# Patient Record
Sex: FEMALE | Race: WHITE | NOT HISPANIC OR LATINO | Employment: OTHER | URBAN - NONMETROPOLITAN AREA
[De-identification: names, ages, dates, MRNs, and addresses within clinical notes are randomized per-mention and may not be internally consistent; named-entity substitution may affect disease eponyms.]

---

## 2017-01-19 ENCOUNTER — OFFICE VISIT (OUTPATIENT)
Dept: CARDIOLOGY | Facility: CLINIC | Age: 72
End: 2017-01-19

## 2017-01-19 VITALS
BODY MASS INDEX: 34.96 KG/M2 | WEIGHT: 190 LBS | HEIGHT: 62 IN | HEART RATE: 101 BPM | SYSTOLIC BLOOD PRESSURE: 138 MMHG | DIASTOLIC BLOOD PRESSURE: 78 MMHG

## 2017-01-19 DIAGNOSIS — R01.1 CARDIAC MURMUR, UNSPECIFIED: ICD-10-CM

## 2017-01-19 DIAGNOSIS — R00.2 PALPITATIONS: ICD-10-CM

## 2017-01-19 DIAGNOSIS — R06.09 DYSPNEA ON EXERTION: ICD-10-CM

## 2017-01-19 DIAGNOSIS — I08.0 MITRAL AND AORTIC INSUFFICIENCY: ICD-10-CM

## 2017-01-19 DIAGNOSIS — I73.9 PVD (PERIPHERAL VASCULAR DISEASE) (HCC): Primary | ICD-10-CM

## 2017-01-19 PROCEDURE — 99213 OFFICE O/P EST LOW 20 MIN: CPT | Performed by: INTERNAL MEDICINE

## 2017-01-19 NOTE — MR AVS SNAPSHOT
Jany Landaverde   1/19/2017 2:45 PM   Office Visit    Dept Phone:  [phone]   Encounter #:  [id number]    Provider:  Hermann Coronado MD   Department:  [facility] CARDIOLOGY                Your Full Care Plan              Your Updated Medication List          This list is accurate as of: 1/19/17  3:20 PM.  Always use your most recent med list.                aspirin 81 MG EC tablet       diltiaZEM  MG 24 hr capsule   Commonly known as:  CARDIZEM CD       fluticasone 50 MCG/ACT nasal spray   Commonly known as:  FLONASE       hydrochlorothiazide 12.5 MG capsule   Commonly known as:  MICROZIDE       irbesartan 75 MG tablet   Commonly known as:  AVAPRO       levothyroxine 50 MCG tablet   Commonly known as:  SYNTHROID, LEVOTHROID       magnesium oxide 400 (241.3 MG) MG tablet tablet   Commonly known as:  MAGOX       omeprazole 20 MG capsule   Commonly known as:  priLOSEC       PROBIOTIC DAILY PO       raNITIdine 75 MG tablet   Commonly known as:  ZANTAC       vitamin B-12 2500 MCG sublingual tablet tablet   Commonly known as:  CYANOCOBALAMIN       ZOCOR 40 MG tablet   Generic drug:  simvastatin               You Were Diagnosed With        Codes Comments    PVD (peripheral vascular disease)    -  Primary ICD-10-CM: I73.9  ICD-9-CM: 443.9     Palpitations     ICD-10-CM: R00.2  ICD-9-CM: 785.1     Cardiac murmur, unspecified     ICD-10-CM: R01.1  ICD-9-CM: 785.2     Dyspnea on exertion     ICD-10-CM: R06.09  ICD-9-CM: 786.09     Mitral and aortic insufficiency     ICD-10-CM: I08.0  ICD-9-CM: 396.3       Instructions     None    Patient Instructions History      Upcoming Appointments     Visit Type Date Time Department    OFFICE VISIT 1/19/2017  2:45 PM [facility] HEART [facility] ECHO 2D COMPLETE [place] 7/12/2017 11:00 AM [facility]    OFFICE VISIT 7/27/2017  1:15 PM [facility]      Chuck Signup     [facility] allows you to send  "messages to your doctor, view your test results, renew your prescriptions, schedule appointments, and more. To sign up, go to [employer]STYLE and click on the Sign Up Now link in the New User? box. Enter your [employer] Activation Code exactly as it appears below along with the last four digits of your Social Security Number and your Date of Birth () to complete the sign-up process. If you do not sign up before the expiration date, you must request a new code.    Pegg[employer] Activation Code: [email or web address]  Expires: 2017  3:20 PM    If you have questions, you can email [email or web address] or call [phone] to talk to our [employer] staff. Remember, Peg[employer] is NOT to be used for urgent needs. For medical emergencies, dial 911.               Other Info from Your Visit           Your Appointments     2017 11:00 AM CDT   ECHOCARDIOGRAM 2D COMPLETE VISIT with [facility]ARTCARE ECHO [facility] CARDIOLOGY (--)    [address]  [place] [address] [phone]           Bring your insurance cards with you. Wear comfortable clothing and shoes.            2017  1:15 PM CDT   Office Visit with Hermann Coronado MD   [facility] CARDIOLOGY (--)    [address] [facility] [address]  [place] [address] [phone]           Arrive 15 minutes prior to appointment.              Allergies     No Known Allergies      Vital Signs     Blood Pressure Pulse Height Weight Body Mass Index Smoking Status    138/78 101 62\" (157.5 cm) 190 lb (86.2 kg) 34.75 kg/m2 Current Every Day Smoker      Problems and Diagnoses Noted     Heart murmur    Shortness of breath    Mitral and aortic insufficiency    Palpitations    PVD (peripheral vascular disease)        "

## 2017-01-22 PROCEDURE — 93000 ELECTROCARDIOGRAM COMPLETE: CPT | Performed by: INTERNAL MEDICINE

## 2017-01-22 NOTE — PROGRESS NOTES
Jany Landaverde  72 y.o. female    01/19/2017  1. PVD (peripheral vascular disease)    2. Palpitations    3. Cardiac murmur, unspecified    4. Dyspnea on exertion    5. Mitral and aortic insufficiency        History of Present Illness    Ms Landaverde is here for follow-up of her above stated problems.  She denied any chest pain but does have dyspnea on exertion which is NYHA class II and most likely related to her pulmonary status.  He has long-standing COPD.  Unfortunately she continues to smoke and we had a discussion about smoking cessation.  EKG today showed sinus rhythm heart rate of 101 bpm with poor R wave progression in the anteroseptal leads and the volttgage criteria for left and hypertrophy.  Nonspecific ST-T changes were noted.  No signs of congestive heart failure was noted.  Her valvular heart disease is clinically stable.  She is known to have mild aortic valve insufficiency and mild tricuspid regurgitation, mild mitral regurgitation and mild gradient across the left ventricular outflow tract.        SUBJECTIVE    No Known Allergies      Past Medical History   Diagnosis Date   • Cardiac murmur, unspecified    • COPD (chronic obstructive pulmonary disease)    • Dyspnea    • GERD (gastroesophageal reflux disease)    • Hyperlipidemia    • Hypertension    • Hypothyroidism    • Mitral regurgitation    • Nonrheumatic mitral valve disorder    • Palpitations    • Precordial pain    • PVD (peripheral vascular disease)    • Scarlet fever          Past Surgical History   Procedure Laterality Date   • Carotid endarterectomy     • Cardiac catheterization     • Abdominal surgery     • Carpal tunnel release     • Cataract extraction           No family history on file.      Social History     Social History   • Marital status:      Spouse name: N/A   • Number of children: N/A   • Years of education: N/A     Occupational History   • Not on file.     Social History Main Topics   • Smoking status: Current Every  "Day Smoker     Types: Cigarettes   • Smokeless tobacco: Never Used   • Alcohol use No   • Drug use: No   • Sexual activity: Defer     Other Topics Concern   • Not on file     Social History Narrative         Current Outpatient Prescriptions   Medication Sig Dispense Refill   • aspirin 81 MG EC tablet Take 81 mg by mouth Daily.     • diltiaZEM CD (CARDIZEM CD) 180 MG 24 hr capsule Take 180 mg by mouth Daily.     • fluticasone (FLONASE) 50 MCG/ACT nasal spray 2 sprays into each nostril Daily.     • hydrochlorothiazide (MICROZIDE) 12.5 MG capsule Take 12.5 mg by mouth Daily.     • irbesartan (AVAPRO) 75 MG tablet Take 75 mg by mouth Every Night.     • levothyroxine (SYNTHROID, LEVOTHROID) 50 MCG tablet Take 50 mcg by mouth Daily.     • magnesium oxide (MAGOX) 400 (241.3 MG) MG tablet tablet Take 400 mg by mouth 2 (Two) Times a Day.     • omeprazole (priLOSEC) 20 MG capsule Take 20 mg by mouth Daily.     • Probiotic Product (PROBIOTIC DAILY PO) Take  by mouth.     • raNITIdine (ZANTAC) 75 MG tablet Take 75 mg by mouth Every Night.     • simvastatin (ZOCOR) 40 MG tablet Take 40 mg by mouth Every Night.     • vitamin B-12 (CYANOCOBALAMIN) 2500 MCG sublingual tablet tablet Place  under the tongue Daily.       No current facility-administered medications for this visit.          OBJECTIVE    Visit Vitals   • /78   • Pulse 101   • Ht 62\" (157.5 cm)   • Wt 190 lb (86.2 kg)   • BMI 34.75 kg/m2           Review of Systems     Constitutional:  Denies recent weight loss, weight gain, fever or chills, no change in exercise tolerance     HENT:  Denies any hearing loss, epistaxis, hoarseness, or difficulty speaking.     Eyes: Wears eyeglasses or contact lenses     Respiratory:  Dyspnea with exertion,no cough, wheezing, or hemoptysis.     Cardiovascular: Negative for palpations, chest pain, orthopnea, PND, peripheral edema, syncope, or claudication.     Gastrointestinal:  Denies change in bowel habits, dyspepsia, ulcer disease, " hematochezia, or melena.     Endocrine: Negative for cold intolerance, heat intolerance, polydipsia, polyphagia and polyuria. Denies any history of weight change, heat/cold intolerance, polydipsia, polyuria     Genitourinary: Negative.      Musculoskeletal: Denies any history of arthritic symptoms or back problems     Skin:  Denies any change in hair or nails, rashes, or skin lesions.     Allergic/Immunologic: Negative.  Negative for environmental allergies, food allergies and immunocompromised state.     Neurological:  Denies any history of recurrent headaches, strokes, TIA, or seizure disorder.     Hematological: Denies any food allergies, seasonal allergies, bleeding disorders, or lymphadenopathy.     Psychiatric/Behavioral: Denies any history of depression, substance abuse, or change in cognitive function.         Physical Exam     Constitutional: Cooperative, alert and oriented, well-developed, well-nourished, in no acute distress.     HENT:   Head: Normocephalic, normal hair patterns, no masses or tenderness.  Ears, Nose, and Throat: No gross abnormalities. No pallor or cyanosis. Dentition good.   Eyes: EOMS intact, PERRL, conjunctivae and lids unremarkable. Fundoscopic exam and visual fields not performed.   Neck: No palpable masses or adenopathy, no thyromegaly, no JVD, carotid pulses are full and equal bilaterally and without  Bruits.     Cardiovascular: Regular rhythm, S1 and S2 normal, no S3 or S4. Apical impulse not displaced. No murmurs, gallops, or rubs detected.     Pulmonary/Chest: Chest: Increased AP diameter, no tenderness to palpation, normal respiratory excursion, no intercostal retraction, no use of accessory muscles.            Pulmonary: Normal breath sounds. No rales or ronchi.    Abdominal: Abdomen soft, bowel sounds normoactive, no masses, no hepatosplenomegaly, non-tender, no bruits.     Musculoskeletal: No deformities, clubbing, cyanosis, erythema, or edema observed. There are no spinal  abnormalities noted. Normal muscle strength and tone. Pulses full and equal in all extremities, no bruits auscultated.     Neurological: No gross motor or sensory deficits noted, affect appropriate, oriented to time, person, place.     Skin: Warm and dry to the touch, no apparent skin lesions or masses noted.     Psychiatric: She has a normal mood and affect. Her behavior is normal. Judgment and thought content normal.           ECG 12 Lead  Date/Time: 1/22/2017 5:08 PM  Performed by: CAMERON MONTENEGRO  Authorized by: CAMERON MONTENEGRO   Comparison: not compared with previous ECG   Rhythm: sinus rhythm  Rate: normal  QRS axis: normal  Other findings: LVH  Comments: EKG showed sinus rhythm heart rate of 101 bpm, normal axis, left and hypertrophy, poor R wave progression in leads V1 and V2 with nonspecific ST-T changes. EKG was performed on 1/19/17              No results found for: WBC, HGB, HCT, MCV, PLT  No results found for: GLUCOSE, BUN, CREATININE, EGFRIFNONA, EGFRIFAFRI, BCR, CO2, CALCIUM, PROTENTOTREF, ALBUMIN, LABIL2, AST, ALT  No results found for: CHOL  No results found for: TRIG  No results found for: HDL  No results found for: LDLCALC  No results found for: LDL  No results found for: HDLLDLRATIO  No components found for: CHOLHDL  No results found for: HGBA1C  No results found for: TSH, M5QGDEX, P5WAYNJ, THYROIDAB        ASSESSMENT AND PLAN    Ms. Landaverde is stable from a cardiac standpoint with no evidence of angina, arrhythmia or congestive heart failure.  Her present antiplatelet therapy with aspirin, antihypertensive therapy with diltiazem, HCTZ, Avapro and lipid-lowering therapy with simvastatin has been continued.  An echocardiogram to asses her valvular function will be arranged prior to her next visit.  Diagnoses and all orders for this visit:    PVD (peripheral vascular disease)  -     Adult Transthoracic Echo Complete; Future    Palpitations  -     Adult Transthoracic Echo  Complete; Future    Cardiac murmur, unspecified  -     Adult Transthoracic Echo Complete; Future    Dyspnea on exertion  -     Adult Transthoracic Echo Complete; Future    Mitral and aortic insufficiency  -     Adult Transthoracic Echo Complete; Future        Hermann Coronado MD  1/22/2017  5:04 PM

## 2017-08-02 ENCOUNTER — OFFICE VISIT (OUTPATIENT)
Dept: CARDIOLOGY | Facility: CLINIC | Age: 72
End: 2017-08-02

## 2017-08-02 VITALS
HEIGHT: 62 IN | BODY MASS INDEX: 35.51 KG/M2 | SYSTOLIC BLOOD PRESSURE: 146 MMHG | HEART RATE: 86 BPM | DIASTOLIC BLOOD PRESSURE: 72 MMHG | WEIGHT: 193 LBS

## 2017-08-02 DIAGNOSIS — R00.2 PALPITATIONS: ICD-10-CM

## 2017-08-02 DIAGNOSIS — I25.10 ATHEROSCLEROSIS OF NATIVE CORONARY ARTERY OF NATIVE HEART WITHOUT ANGINA PECTORIS: ICD-10-CM

## 2017-08-02 DIAGNOSIS — I08.0 MITRAL AND AORTIC INSUFFICIENCY: Primary | ICD-10-CM

## 2017-08-02 PROCEDURE — 99213 OFFICE O/P EST LOW 20 MIN: CPT | Performed by: INTERNAL MEDICINE

## 2017-08-02 RX ORDER — FUROSEMIDE 20 MG/1
20 TABLET ORAL DAILY
Qty: 30 TABLET | Refills: 3 | Status: SHIPPED | OUTPATIENT
Start: 2017-08-02

## 2017-08-02 RX ORDER — IBUPROFEN 200 MG
200 TABLET ORAL 2 TIMES DAILY
COMMUNITY

## 2017-08-02 NOTE — PROGRESS NOTES
Jany Landaverde  72 y.o. female    08/02/2017  1. Mitral and aortic insufficiency    2. Palpitations    3. Atherosclerosis of native coronary artery of native heart without angina pectoris        History of Present Illness    Ms. Landaverde is here for follow-up of above stated problems.  She denied any chest pain but has chronic NYHA class II dyspnea on exertion which is most likely secondary to her pulmonary status.  She unfortunately continues to smoke and does not wish to quit.    She has had previous cardiac catheterization which showed medically manageable noncritical disease.  Echocardiogram showed normal LV systolic function with no significant valvular abnormalities.  Her right heart pressures are within normal limits.  Her main complaint was some degree of swelling in the lower extremities.  No signs of congestive heart failure were noted.  Blood pressure was in the normal range.        SUBJECTIVE    No Known Allergies      Past Medical History:   Diagnosis Date   • Cardiac murmur, unspecified    • COPD (chronic obstructive pulmonary disease)    • Dyspnea    • GERD (gastroesophageal reflux disease)    • Hyperlipidemia    • Hypertension    • Hypothyroidism    • Mitral regurgitation    • Myocardial infarction    • Nonrheumatic mitral valve disorder    • Palpitations    • Precordial pain    • PVD (peripheral vascular disease)    • Scarlet fever    • Stroke          Past Surgical History:   Procedure Laterality Date   • ABDOMINAL SURGERY     • CARDIAC CATHETERIZATION     • CAROTID ENDARTERECTOMY     • CARPAL TUNNEL RELEASE     • CATARACT EXTRACTION           No family history on file.      Social History     Social History   • Marital status:      Spouse name: N/A   • Number of children: N/A   • Years of education: N/A     Occupational History   • Not on file.     Social History Main Topics   • Smoking status: Current Every Day Smoker     Types: Cigarettes   • Smokeless tobacco: Never Used   • Alcohol  "use No   • Drug use: No   • Sexual activity: Defer     Other Topics Concern   • Not on file     Social History Narrative         Current Outpatient Prescriptions   Medication Sig Dispense Refill   • aspirin 81 MG EC tablet Take 81 mg by mouth Daily.     • diltiaZEM CD (CARDIZEM CD) 180 MG 24 hr capsule Take 180 mg by mouth Daily.     • fluticasone (FLONASE) 50 MCG/ACT nasal spray 2 sprays into each nostril Daily.     • hydrochlorothiazide (MICROZIDE) 12.5 MG capsule Take 12.5 mg by mouth Daily.     • ibuprofen (ADVIL,MOTRIN) 200 MG tablet Take 200 mg by mouth 2 (Two) Times a Day.     • irbesartan (AVAPRO) 75 MG tablet Take 75 mg by mouth Every Night.     • levothyroxine (SYNTHROID, LEVOTHROID) 50 MCG tablet Take 50 mcg by mouth Daily.     • magnesium oxide (MAGOX) 400 (241.3 MG) MG tablet tablet Take 400 mg by mouth 2 (Two) Times a Day.     • omeprazole (priLOSEC) 20 MG capsule Take 20 mg by mouth Daily.     • Probiotic Product (PROBIOTIC DAILY PO) Take  by mouth.     • raNITIdine (ZANTAC) 75 MG tablet Take 75 mg by mouth Every Night.     • simvastatin (ZOCOR) 40 MG tablet Take 40 mg by mouth Every Night.     • vitamin B-12 (CYANOCOBALAMIN) 2500 MCG sublingual tablet tablet Place  under the tongue Daily.     • furosemide (LASIX) 20 MG tablet Take 1 tablet by mouth Daily. 30 tablet 3     No current facility-administered medications for this visit.          OBJECTIVE    /72  Pulse 86  Ht 62\" (157.5 cm)  Wt 193 lb (87.5 kg)  BMI 35.3 kg/m2        Review of Systems     Constitutional:  Denies recent weight loss, weight gain, fever or chills, no change in exercise tolerance     HENT:  Denies any hearing loss, epistaxis, hoarseness, or difficulty speaking.     Eyes: Wears eyeglasses or contact lenses     Respiratory:  Dyspnea with exertion, no cough, wheezing, or hemoptysis.     Cardiovascular: Negative for palpations, chest pain    Gastrointestinal:  Denies change in bowel habits, dyspepsia, ulcer disease, " hematochezia, or melena.     Endocrine: Negative for cold intolerance, heat intolerance, polydipsia, polyphagia and polyuria.  Genitourinary: Negative.      Musculoskeletal: Swelling in the lower extremities    Skin:  Denies any change in hair or nails, rashes, or skin lesions.     Allergic/Immunologic: Negative.  Negative for environmental allergies, food allergies and immunocompromised state.     Neurological:  Denies any history of recurrent headaches, strokes, TIA, or seizure disorder.     Hematological: Denies any food allergies, seasonal allergies, bleeding disorders, or lymphadenopathy.         Physical Exam     Constitutional: Cooperative, alert and oriented, well-developed, well-nourished, in no acute distress.     HENT:   Head: Normocephalic, normal hair patterns, no masses or tenderness.  Ears, Nose, and Throat: No gross abnormalities. No pallor or cyanosis.   Eyes: EOMS intact, PERRL, conjunctivae and lids unremarkable. Fundoscopic exam and visual fields not performed.   Neck: No palpable masses or adenopathy, no thyromegaly, no JVD, carotid pulses are full and equal bilaterally and without  Bruits.     Cardiovascular: Regular rhythm, S1 and S2 normal, no S3 or S4.  No murmurs, gallops, or rubs detected.     Pulmonary/Chest: Chest: normal symmetry, no tenderness to palpation, normal respiratory excursion, no use of accessory muscles.            Pulmonary: Normal breath sounds. No rales or ronchi.    Abdominal: Abdomen soft, bowel sounds normoactive, no masses, no hepatosplenomegaly, non-tender, no bruits.     Musculoskeletal: No deformities, clubbing, cyanosis, erythema, 1-2+ pitting edema. Pulses full and equal in all extremities, no bruits auscultated.     Neurological: No gross motor or sensory deficits noted, affect appropriate, oriented to time, person, place.     Skin: Warm and dry to the touch, no apparent skin lesions or masses noted.     Psychiatric: She has a normal mood and affect. Her  behavior is normal. Judgment and thought content normal.         Procedures      No results found for: WBC, HGB, HCT, MCV, PLT  No results found for: GLUCOSE, BUN, CREATININE, EGFRIFNONA, EGFRIFAFRI, BCR, CO2, CALCIUM, PROTENTOTREF, ALBUMIN, LABIL2, AST, ALT  No results found for: CHOL  No results found for: TRIG  No results found for: HDL  No results found for: LDLCALC  No results found for: LDL  No results found for: HDLLDLRATIO  No components found for: CHOLHDL  No results found for: HGBA1C  No results found for: TSH, V8ZGKNC, I5ILMVG, THYROIDAB        ASSESSMENT AND PLAN  Ms. Landaverde is stable from a cardiac standpoint with no definite evidence of angina or congestive heart failure.  Her dyspnea is most likely related to her pulmonary status.  I suspect that her leg edema is secondary to side effect of diltiazem CD and venous stasis.  I have advised her to take Lasix 20 mg to be taken up to 3 times a week.  Her present antiplatelet therapy with aspirin, antihypertensive therapy with Cardizem CD, HCTZ and Avapro and lipid-lowering therapy with simvastatin has been continued.    Diagnoses and all orders for this visit:    Mitral and aortic insufficiency    Palpitations    Atherosclerosis of native coronary artery of native heart without angina pectoris    Other orders  -     furosemide (LASIX) 20 MG tablet; Take 1 tablet by mouth Daily.        Hermann Coronado MD  8/2/2017  10:35 AM

## 2018-02-06 ENCOUNTER — OFFICE VISIT (OUTPATIENT)
Dept: CARDIOLOGY | Facility: CLINIC | Age: 73
End: 2018-02-06

## 2018-02-06 VITALS
HEIGHT: 62 IN | DIASTOLIC BLOOD PRESSURE: 82 MMHG | BODY MASS INDEX: 36.07 KG/M2 | HEART RATE: 85 BPM | SYSTOLIC BLOOD PRESSURE: 140 MMHG | WEIGHT: 196 LBS

## 2018-02-06 DIAGNOSIS — I35.0 NONRHEUMATIC AORTIC VALVE STENOSIS: ICD-10-CM

## 2018-02-06 DIAGNOSIS — I25.10 ATHEROSCLEROSIS OF NATIVE CORONARY ARTERY OF NATIVE HEART WITHOUT ANGINA PECTORIS: Primary | ICD-10-CM

## 2018-02-06 DIAGNOSIS — R01.1 CARDIAC MURMUR: ICD-10-CM

## 2018-02-06 PROCEDURE — 99214 OFFICE O/P EST MOD 30 MIN: CPT | Performed by: INTERNAL MEDICINE

## 2018-02-06 NOTE — PROGRESS NOTES
Jany Landaverde  73 y.o. female    02/06/2018  1. Atherosclerosis of native coronary artery of native heart without angina pectoris    2. Nonrheumatic aortic valve stenosis    3. Cardiac murmur, unspecified        History of Present Illness    Ms. Landaverde is here for follow-up of her above stated problems.  She denied any chest pain, shortness of breath, palpitation, dizziness or syncope.  Echocardiogram in July 2017 showed normal left systolic function at different hypertrophy, diastolic dysfunction and mild aortic valve stenosis.  She has undergone cardiac catheterization echo in 2013 which showed mild to moderate lesion in the LAD up to 50% and noncritical disease in the circumflex coronary artery and right coronary.  No bronchospasm or signs of congestive heart failure was noted.        SUBJECTIVE    No Known Allergies      Past Medical History:   Diagnosis Date   • Cardiac murmur, unspecified    • COPD (chronic obstructive pulmonary disease)    • Dyspnea    • GERD (gastroesophageal reflux disease)    • Hyperlipidemia    • Hypertension    • Hypothyroidism    • Mitral regurgitation    • Myocardial infarction    • Nonrheumatic mitral valve disorder    • Palpitations    • Precordial pain    • PVD (peripheral vascular disease)    • Scarlet fever    • Stroke          Past Surgical History:   Procedure Laterality Date   • ABDOMINAL SURGERY     • CARDIAC CATHETERIZATION     • CAROTID ENDARTERECTOMY     • CARPAL TUNNEL RELEASE     • CATARACT EXTRACTION           Family History   Problem Relation Age of Onset   • No Known Problems Mother    • No Known Problems Father          Social History     Social History   • Marital status:      Spouse name: N/A   • Number of children: N/A   • Years of education: N/A     Occupational History   • Not on file.     Social History Main Topics   • Smoking status: Current Every Day Smoker     Types: Cigarettes   • Smokeless tobacco: Never Used   • Alcohol use No   • Drug use: No  "  • Sexual activity: Defer     Other Topics Concern   • Not on file     Social History Narrative         Current Outpatient Prescriptions   Medication Sig Dispense Refill   • aspirin 81 MG EC tablet Take 81 mg by mouth Daily.     • diltiaZEM CD (CARDIZEM CD) 180 MG 24 hr capsule Take 180 mg by mouth Daily.     • fluticasone (FLONASE) 50 MCG/ACT nasal spray 2 sprays into each nostril Daily.     • furosemide (LASIX) 20 MG tablet Take 1 tablet by mouth Daily. 30 tablet 3   • hydrochlorothiazide (MICROZIDE) 12.5 MG capsule Take 12.5 mg by mouth Daily.     • ibuprofen (ADVIL,MOTRIN) 200 MG tablet Take 200 mg by mouth 2 (Two) Times a Day.     • irbesartan (AVAPRO) 75 MG tablet Take 75 mg by mouth Every Night.     • levothyroxine (SYNTHROID, LEVOTHROID) 50 MCG tablet Take 50 mcg by mouth Daily.     • magnesium oxide (MAGOX) 400 (241.3 MG) MG tablet tablet Take 400 mg by mouth 2 (Two) Times a Day.     • omeprazole (priLOSEC) 20 MG capsule Take 20 mg by mouth Daily.     • Probiotic Product (PROBIOTIC DAILY PO) Take  by mouth.     • raNITIdine (ZANTAC) 75 MG tablet Take 75 mg by mouth Every Night.     • simvastatin (ZOCOR) 40 MG tablet Take 40 mg by mouth Every Night.     • vitamin B-12 (CYANOCOBALAMIN) 2500 MCG sublingual tablet tablet Place  under the tongue Daily.       No current facility-administered medications for this visit.          OBJECTIVE    /82  Pulse 85  Ht 157.5 cm (62\")  Wt 88.9 kg (196 lb)  BMI 35.85 kg/m2        Review of Systems     Constitutional:  Denies recent weight loss, weight gain, fever or chills, no change in exercise tolerance     HENT:  Denies any hearing loss, epistaxis, hoarseness, or difficulty speaking.     Eyes: Wears eyeglasses or contact lenses     Respiratory:  Denies dyspnea with exertion,no cough, wheezing, or hemoptysis.     Cardiovascular: Negative for palpations, chest pain, orthopnea, PND, peripheral edema, syncope, or claudication.     Gastrointestinal:  Denies change in " bowel habits, dyspepsia, ulcer disease, hematochezia, or melena.     Endocrine: Negative for cold intolerance, heat intolerance, polydipsia, polyphagia and polyuria.    Genitourinary: Negative.      Musculoskeletal: Denies any history of arthritic symptoms or back problems     Skin:  Denies any change in hair or nails, rashes, or skin lesions.     Allergic/Immunologic: Negative.  Negative for environmental allergies, food allergies and immunocompromised state.     Neurological:  Denies any history of recurrent headaches, strokes, TIA, or seizure disorder.     Hematological: Denies any food allergies, seasonal allergies, bleeding disorders, or lymphadenopathy.     Psychiatric/Behavioral: Denies any history of depression, substance abuse, or change in cognitive function.         Physical Exam     Constitutional: Cooperative, alert and oriented, well-developed, well-nourished, in no acute distress.     HENT:   Head: Normocephalic, normal hair patterns, no masses or tenderness.  Ears, Nose, and Throat: No gross abnormalities. No pallor or cyanosis.   Eyes: EOMS intact, PERRL, conjunctivae and lids unremarkable. Fundoscopic exam and visual fields not performed.   Neck: No palpable masses or adenopathy, no thyromegaly, no JVD, carotid pulses are full and equal bilaterally and without  Bruits.     Cardiovascular: Regular rhythm, S1 and S2 normal, no S3 or S4, 3/6 systolic murmur, gallops, or rubs detected.     Pulmonary/Chest: Chest: normal symmetry, no tenderness to palpation, normal respiratory excursion, no intercostal retraction, no use of accessory muscles.            Pulmonary: Normal breath sounds. No rales or ronchi.    Abdominal: Abdomen soft, bowel sounds normoactive, no masses, no hepatosplenomegaly, non-tender, no bruits.     Musculoskeletal: No deformities, clubbing, cyanosis, erythema, or edema observed. There are no spinal abnormalities noted.     Neurological: No gross motor or sensory deficits noted,  affect appropriate, oriented to time, person, place.     Skin: Warm and dry to the touch, no apparent skin lesions or masses noted.     Psychiatric: She has a normal mood and affect. Her behavior is normal. Judgment and thought content normal.         Procedures      No results found for: WBC, HGB, HCT, MCV, PLT  No results found for: GLUCOSE, BUN, CREATININE, EGFRIFNONA, EGFRIFAFRI, BCR, CO2, CALCIUM, PROTENTOTREF, ALBUMIN, LABIL2, AST, ALT  No results found for: CHOL  No results found for: TRIG  No results found for: HDL  No results found for: LDLCALC  No results found for: LDL  No results found for: HDLLDLRATIO  No components found for: CHOLHDL  No results found for: HGBA1C  No results found for: TSH, L2JYOSZ, K1HSHLF, THYROIDAB        ASSESSMENT AND PLAN  Ms. Landaverde is stable with regards to her heart with no definite evidence of angina, arrhythmia or congestive heart failure.  She has mild aortic valve stenosis which is being monitored closely.  Antiplatelet therapy with aspirin, antihypertensive therapy with Cardizem CD, Avapro, HCTZ, statin therapy with Zocor have been continued.  She takes intermittent Lasix.  She will be seeing  soon and will have lab work done.    Jany was seen today for follow-up.    Diagnoses and all orders for this visit:    Atherosclerosis of native coronary artery of native heart without angina pectoris    Nonrheumatic aortic valve stenosis    Cardiac murmur, unspecified        Hermann Coronado MD  2/6/2018  11:06 AM

## 2019-04-09 ENCOUNTER — OFFICE VISIT (OUTPATIENT)
Dept: CARDIOLOGY | Facility: CLINIC | Age: 74
End: 2019-04-09

## 2019-04-09 VITALS
BODY MASS INDEX: 35.33 KG/M2 | OXYGEN SATURATION: 98 % | HEART RATE: 90 BPM | WEIGHT: 192 LBS | HEIGHT: 62 IN | SYSTOLIC BLOOD PRESSURE: 122 MMHG | DIASTOLIC BLOOD PRESSURE: 80 MMHG

## 2019-04-09 DIAGNOSIS — R06.09 DYSPNEA ON EXERTION: ICD-10-CM

## 2019-04-09 DIAGNOSIS — R01.1 CARDIAC MURMUR: ICD-10-CM

## 2019-04-09 DIAGNOSIS — I25.10 ATHEROSCLEROSIS OF NATIVE CORONARY ARTERY OF NATIVE HEART WITHOUT ANGINA PECTORIS: Primary | ICD-10-CM

## 2019-04-09 DIAGNOSIS — I35.0 NONRHEUMATIC AORTIC VALVE STENOSIS: ICD-10-CM

## 2019-04-09 PROCEDURE — 99214 OFFICE O/P EST MOD 30 MIN: CPT | Performed by: INTERNAL MEDICINE

## 2019-04-09 RX ORDER — LOSARTAN POTASSIUM 50 MG/1
50 TABLET ORAL DAILY
COMMUNITY
End: 2019-10-18

## 2019-04-09 NOTE — PROGRESS NOTES
Jany Landaverde  74 y.o. female    04/09/2019  1. Atherosclerosis of native coronary artery of native heart without angina pectoris    2. Nonrheumatic aortic valve stenosis    3. Cardiac murmur, unspecified    4. Dyspnea on exertion        History of Present Illness  Ms. Landaverde is here for follow-up of her above-stated problems.  She denied any chest pain, shortness of breath, palpitation, dizziness or syncope.  She has been compliant with her medications.  She unfortunately continues to smoke and we had a discussion about smoking cessation.  She is known to have mild aortic valve insufficiency by echocardiogram in the past.  Cardiac catheterization in 2013 showed medically manageable noncritical CAD.    SUBJECTIVE    No Known Allergies      Past Medical History:   Diagnosis Date   • Cardiac murmur, unspecified    • COPD (chronic obstructive pulmonary disease) (CMS/HCC)    • Dyspnea    • GERD (gastroesophageal reflux disease)    • Hyperlipidemia    • Hypertension    • Hypothyroidism    • Mitral regurgitation    • Myocardial infarction (CMS/HCC)    • Nonrheumatic mitral valve disorder    • Palpitations    • Precordial pain    • PVD (peripheral vascular disease) (CMS/HCC)    • Scarlet fever    • Stroke (CMS/HCC)          Past Surgical History:   Procedure Laterality Date   • ABDOMINAL SURGERY     • CARDIAC CATHETERIZATION     • CAROTID ENDARTERECTOMY     • CARPAL TUNNEL RELEASE     • CATARACT EXTRACTION           Family History   Problem Relation Age of Onset   • No Known Problems Mother    • No Known Problems Father          Social History     Socioeconomic History   • Marital status:      Spouse name: Not on file   • Number of children: Not on file   • Years of education: Not on file   • Highest education level: Not on file   Tobacco Use   • Smoking status: Current Every Day Smoker     Types: Cigarettes   • Smokeless tobacco: Never Used   Substance and Sexual Activity   • Alcohol use: No   • Drug use: No  "  • Sexual activity: Defer         Current Outpatient Medications   Medication Sig Dispense Refill   • aspirin 81 MG EC tablet Take 81 mg by mouth Daily.     • diltiaZEM CD (CARDIZEM CD) 180 MG 24 hr capsule Take 180 mg by mouth Daily.     • fluticasone (FLONASE) 50 MCG/ACT nasal spray 2 sprays into each nostril Daily.     • furosemide (LASIX) 20 MG tablet Take 1 tablet by mouth Daily. 30 tablet 3   • hydrochlorothiazide (MICROZIDE) 12.5 MG capsule Take 12.5 mg by mouth Daily.     • ibuprofen (ADVIL,MOTRIN) 200 MG tablet Take 200 mg by mouth 2 (Two) Times a Day.     • irbesartan (AVAPRO) 75 MG tablet Take 75 mg by mouth Every Night.     • levothyroxine (SYNTHROID, LEVOTHROID) 50 MCG tablet Take 50 mcg by mouth Daily.     • losartan (COZAAR) 50 MG tablet Take 50 mg by mouth Daily.     • magnesium oxide (MAGOX) 400 (241.3 MG) MG tablet tablet Take 400 mg by mouth 2 (Two) Times a Day.     • omeprazole (priLOSEC) 20 MG capsule Take 20 mg by mouth Daily.     • Probiotic Product (PROBIOTIC DAILY PO) Take  by mouth.     • raNITIdine (ZANTAC) 75 MG tablet Take 75 mg by mouth Every Night.     • simvastatin (ZOCOR) 40 MG tablet Take 40 mg by mouth Every Night.     • vitamin B-12 (CYANOCOBALAMIN) 2500 MCG sublingual tablet tablet Place  under the tongue Daily.       No current facility-administered medications for this visit.          OBJECTIVE    /80   Pulse 90   Ht 157.5 cm (62\")   Wt 87.1 kg (192 lb)   SpO2 98%   BMI 35.12 kg/m²         Review of Systems     Constitutional:  Denies recent weight loss, weight gain, fever or chills, no change in exercise tolerance     HENT:  Denies any hearing loss, epistaxis, hoarseness, or difficulty speaking.     Eyes: Wears eyeglasses or contact lenses     Respiratory:  Denies dyspnea with exertion,no cough, wheezing, or hemoptysis.     Cardiovascular: Negative for palpations, chest pain, orthopnea, PND, peripheral edema, syncope, or claudication.     Gastrointestinal:  Denies " change in bowel habits, dyspepsia, ulcer disease, hematochezia, or melena.     Endocrine: Negative for cold intolerance, heat intolerance, polydipsia, polyphagia and polyuria. Hypothyroidism, hyperlipidemia    Genitourinary: Negative.      Musculoskeletal: DJD    Skin:  Denies any change in hair or nails, rashes, or skin lesions.     Allergic/Immunologic: Negative.  Negative for environmental allergies, food allergies and immunocompromised state.     Neurological:  Denies any history of recurrent headaches, strokes, TIA, or seizure disorder.     Hematological: Denies any food allergies, seasonal allergies, bleeding disorders, or lymphadenopathy.     Psychiatric/Behavioral: Denies any history of depression, substance abuse, or change in cognitive function.         Physical Exam     Constitutional: Cooperative, alert and oriented, in no acute distress.     HENT:   Head: Normocephalic, normal hair patterns, no masses or tenderness.  Ears, Nose, and Throat: No gross abnormalities. No pallor or cyanosis.   Eyes: EOMS intact, PERRL, conjunctivae and lids unremarkable. Fundoscopic exam and visual fields not performed.   Neck: No palpable masses or adenopathy, no thyromegaly, no JVD, carotid pulses are full and equal bilaterally and without  Bruits.     Cardiovascular: Regular rhythm, S1 and S2 normal, no S3 or S4. 2/6 systolic murmur, no gallops, or rubs detected.     Pulmonary/Chest: Chest: normal symmetry,  normal respiratory excursion, no intercostal retraction, no use of accessory muscles.            Pulmonary: Normal breath sounds. No rales or ronchi.    Abdominal: Abdomen soft, bowel sounds normoactive, no masses, no hepatosplenomegaly, non-tender, no bruits.     Musculoskeletal: No deformities, clubbing, cyanosis, erythema, or edema observed.    Neurological: No gross motor or sensory deficits noted, affect appropriate, oriented to time, person, place.     Skin: Warm and dry to the touch, no apparent skin lesions or  masses noted.     Psychiatric: She has a normal mood and affect. Her behavior is normal. Judgment and thought content normal.         Procedures      No results found for: WBC, HGB, HCT, MCV, PLT  No results found for: GLUCOSE, BUN, CREATININE, EGFRIFNONA, EGFRIFAFRI, BCR, CO2, CALCIUM, PROTENTOTREF, ALBUMIN, LABIL2, AST, ALT  Lab Results   Component Value Date    CHOL 171 03/15/2019     Lab Results   Component Value Date    TRIG 201 (H) 03/15/2019     Lab Results   Component Value Date    HDL 42 03/15/2019     No components found for: LDLCALC  Lab Results   Component Value Date    LDL 95 03/15/2019     No results found for: HDLLDLRATIO  No components found for: CHOLHDL  Lab Results   Component Value Date    HGBA1C 6.5 (H) 03/15/2019     Lab Results   Component Value Date    TSH 3.08 03/15/2019           ASSESSMENT AND PLAN  Jany Landaverde is clinically stable with no evidence of angina, arrhythmia or congestive heart failure.  Her blood pressures in the normal range.  To assess her aortic valve and echocardiogram is being arranged.  Antiplatelet therapy with aspirin, antihypertensive therapy with Cardizem CD, HCTZ, Avapro, lipid-lowering therapy with simvastatin has been continued.  Her lipid profiles were in the normal range when checked in March 2019.    Jany was seen today for follow-up.    Diagnoses and all orders for this visit:    Atherosclerosis of native coronary artery of native heart without angina pectoris  -     Adult Transthoracic Echo Complete W/ Cont if Necessary Per Protocol; Future    Nonrheumatic aortic valve stenosis  -     Adult Transthoracic Echo Complete W/ Cont if Necessary Per Protocol; Future    Cardiac murmur, unspecified  -     Adult Transthoracic Echo Complete W/ Cont if Necessary Per Protocol; Future    Dyspnea on exertion  -     Adult Transthoracic Echo Complete W/ Cont if Necessary Per Protocol; Future        Patient's Body mass index is 35.12 kg/m². BMI is above normal parameters.  Recommendations include: exercise counseling and nutrition counseling.      I advised Jany of the risks of continuing to use tobacco, and I provided her with tobacco cessation educational materials in the After Visit Summary.     During this visit, I spent 3 minutes counseling the patient regarding tobacco cessation.      Hermann Coronado MD  4/9/2019  5:40 PM

## 2019-04-24 ENCOUNTER — DOCUMENTATION (OUTPATIENT)
Dept: CARDIOLOGY | Facility: CLINIC | Age: 74
End: 2019-04-24

## 2019-10-18 ENCOUNTER — OFFICE VISIT (OUTPATIENT)
Dept: CARDIOLOGY | Facility: CLINIC | Age: 74
End: 2019-10-18

## 2019-10-18 VITALS
SYSTOLIC BLOOD PRESSURE: 128 MMHG | HEART RATE: 87 BPM | WEIGHT: 198.7 LBS | BODY MASS INDEX: 36.57 KG/M2 | HEIGHT: 62 IN | OXYGEN SATURATION: 98 % | DIASTOLIC BLOOD PRESSURE: 80 MMHG

## 2019-10-18 DIAGNOSIS — I25.10 ATHEROSCLEROSIS OF NATIVE CORONARY ARTERY OF NATIVE HEART WITHOUT ANGINA PECTORIS: Primary | ICD-10-CM

## 2019-10-18 DIAGNOSIS — I08.0 MITRAL AND AORTIC INSUFFICIENCY: ICD-10-CM

## 2019-10-18 DIAGNOSIS — I73.9 PVD (PERIPHERAL VASCULAR DISEASE) (HCC): ICD-10-CM

## 2019-10-18 DIAGNOSIS — R01.1 CARDIAC MURMUR: ICD-10-CM

## 2019-10-18 PROCEDURE — 99214 OFFICE O/P EST MOD 30 MIN: CPT | Performed by: INTERNAL MEDICINE

## 2019-10-18 NOTE — PROGRESS NOTES
Jany Landaverde  74 y.o. female    10/18/2019  1. Atherosclerosis of native coronary artery of native heart without angina pectoris    2. PVD (peripheral vascular disease) (CMS/HCC)    3. Cardiac murmur, unspecified    4. Mitral and aortic insufficiency        History of Present Illness  Jany Landaverde is here for follow-up of her above-stated problems.  She has done well from a cardiac standpoint with no chest pain, shortness of breath, palpitation, dizziness or syncope.  She has been compliant with her medication but unfortunately continues to smoke and does not wish to quit.  We had a discussion about smoking cessation.  Echocardiogram in April 2019 showed:  · Left ventricular wall thickness is consistent with mild concentric hypertrophy.  · Estimated EF = 61%.  · Left ventricular systolic function is normal.  · Left ventricular diastolic dysfunction (grade I) consistent with impaired relaxation.  · Left atrial cavity size is moderately dilated.  · The valve was poorly visualized but appears trileaflet. The aortic valve is abnormal in structure. No significant aortic valve regurgitation is present. No hemodynamically significant aortic valve stenosis is present.    SUBJECTIVE    No Known Allergies      Past Medical History:   Diagnosis Date   • Cardiac murmur, unspecified    • COPD (chronic obstructive pulmonary disease) (CMS/HCC)    • Dyspnea    • GERD (gastroesophageal reflux disease)    • Hyperlipidemia    • Hypertension    • Hypothyroidism    • Mitral regurgitation    • Myocardial infarction (CMS/HCC)    • Nonrheumatic mitral valve disorder    • Palpitations    • Precordial pain    • PVD (peripheral vascular disease) (CMS/HCC)    • Scarlet fever    • Stroke (CMS/HCC)          Past Surgical History:   Procedure Laterality Date   • ABDOMINAL SURGERY     • CARDIAC CATHETERIZATION     • CAROTID ENDARTERECTOMY     • CARPAL TUNNEL RELEASE     • CATARACT EXTRACTION           Family History   Problem Relation Age  "of Onset   • No Known Problems Mother    • No Known Problems Father          Social History     Socioeconomic History   • Marital status:      Spouse name: Not on file   • Number of children: Not on file   • Years of education: Not on file   • Highest education level: Not on file   Tobacco Use   • Smoking status: Current Every Day Smoker     Types: Cigarettes   • Smokeless tobacco: Never Used   Substance and Sexual Activity   • Alcohol use: No   • Drug use: No   • Sexual activity: Defer         Current Outpatient Medications   Medication Sig Dispense Refill   • aspirin 81 MG EC tablet Take 81 mg by mouth Daily.     • diltiaZEM CD (CARDIZEM CD) 180 MG 24 hr capsule Take 180 mg by mouth Daily.     • fluticasone (FLONASE) 50 MCG/ACT nasal spray 2 sprays into each nostril Daily.     • furosemide (LASIX) 20 MG tablet Take 1 tablet by mouth Daily. 30 tablet 3   • hydrochlorothiazide (MICROZIDE) 12.5 MG capsule Take 12.5 mg by mouth Daily.     • ibuprofen (ADVIL,MOTRIN) 200 MG tablet Take 200 mg by mouth 2 (Two) Times a Day.     • irbesartan (AVAPRO) 75 MG tablet Take 75 mg by mouth Every Night.     • levothyroxine (SYNTHROID, LEVOTHROID) 50 MCG tablet Take 50 mcg by mouth Daily.     • losartan (COZAAR) 50 MG tablet Take 50 mg by mouth Daily.     • magnesium oxide (MAGOX) 400 (241.3 MG) MG tablet tablet Take 400 mg by mouth 2 (Two) Times a Day.     • omeprazole (priLOSEC) 20 MG capsule Take 20 mg by mouth Daily.     • Probiotic Product (PROBIOTIC DAILY PO) Take  by mouth.     • simvastatin (ZOCOR) 40 MG tablet Take 40 mg by mouth Every Night.     • vitamin B-12 (CYANOCOBALAMIN) 2500 MCG sublingual tablet tablet Place  under the tongue Daily.     • raNITIdine (ZANTAC) 75 MG tablet Take 75 mg by mouth Every Night.       No current facility-administered medications for this visit.          OBJECTIVE    /80   Pulse 87   Ht 157.5 cm (62.01\")   Wt 90.1 kg (198 lb 11.2 oz)   SpO2 98%   BMI 36.33 kg/m² "         Review of Systems     Constitutional:  Denies recent weight loss, weight gain, fever or chills     HENT:  Denies any hearing loss, epistaxis, hoarseness, or difficulty speaking.     Eyes: Wears eyeglasses or contact lenses     Respiratory:  Denies dyspnea with exertion,no cough, wheezing, or hemoptysis.     Cardiovascular: Negative for palpations, chest pain, orthopnea, PND    Gastrointestinal:  Denies change in bowel habits, dyspepsia, ulcer disease, hematochezia, or melena.     Endocrine: Negative for cold intolerance, heat intolerance, polydipsia, polyphagia and polyuria.     Genitourinary: Negative.      Musculoskeletal: Denies any history of arthritic symptoms or back problems     Skin:  Denies any change in hair or nails, rashes, or skin lesions.     Allergic/Immunologic: Negative.  Negative for environmental allergies, food allergies and immunocompromised state.     Neurological:  Denies any history of recurrent headaches, strokes, TIA, or seizure disorder.     Hematological: Denies any food allergies, seasonal allergies, bleeding disorders, or lymphadenopathy.     Psychiatric/Behavioral: Denies any history of depression, substance abuse, or change in cognitive function.         Physical Exam     Constitutional: Cooperative, alert and oriented,  in no acute distress. obese    HENT:   Head: Normocephalic, normal hair patterns, no masses or tenderness.  Ears, Nose, and Throat: No gross abnormalities. No pallor or cyanosis.   Eyes: EOMS intact, PERRL, conjunctivae and lids unremarkable. Fundoscopic exam and visual fields not performed.   Neck: No palpable masses or adenopathy, no thyromegaly, no JVD, carotid pulses are full and equal bilaterally and without  Bruits. History of carotid endarterectomy on the right    Cardiovascular: Regular rhythm, S1 and S2 normal, no S3 or S4. 2/6 systolic murmurs, no gallops, or rubs detected.     Pulmonary/Chest: Chest: normal symmetry, no tenderness to palpation,  normal respiratory excursion, no intercostal retraction, no use of accessory muscles.            Pulmonary: Normal breath sounds. No rales or ronchi.    Abdominal: Abdomen soft, bowel sounds normoactive, no masses, no hepatosplenomegaly, non-tender, no bruits.     Musculoskeletal: No deformities, clubbing, cyanosis, erythema, or edema observed.    Neurological: No gross motor or sensory deficits noted, affect appropriate, oriented to time, person, place.     Skin: Warm and dry to the touch, no apparent skin lesions or masses noted.     Psychiatric: She has a normal mood and affect. Her behavior is normal. Judgment and thought content normal.         Procedures      No results found for: WBC, HGB, HCT, MCV, PLT  Lab Results   Component Value Date    BUN 16 03/15/2019    CREATININE 0.9 03/15/2019    CALCIUM 10.4 03/15/2019    ALBUMIN 3.4 03/15/2019    AST 16 03/15/2019    ALT 24 (L) 03/15/2019     Lab Results   Component Value Date    CHOL 171 03/15/2019     Lab Results   Component Value Date    TRIG 201 (H) 03/15/2019     Lab Results   Component Value Date    HDL 42 03/15/2019     No components found for: LDLCALC  Lab Results   Component Value Date    LDL 95 03/15/2019     No results found for: HDLLDLRATIO  No components found for: CHOLHDL  Lab Results   Component Value Date    HGBA1C 6.5 (H) 03/15/2019     Lab Results   Component Value Date    TSH 3.08 03/15/2019           ASSESSMENT AND PLAN  Jany Akhil stable with regard to her heart.  No clinical evidence of angina, arrhythmia or congestive heart failure was noted.  Her lipid profiles were in the normal range when checked in March this year.  Smoking cessation was stressed.  She has been advised to continue antiplatelet therapy with aspirin, antihypertensive therapy with Cardizem CD, HCTZ, Avapro, lipid-lowering therapy with simvastatin has been continued.    Jany was seen today for follow-up.    Diagnoses and all orders for this visit:    Atherosclerosis of  native coronary artery of native heart without angina pectoris    PVD (peripheral vascular disease) (CMS/[facility])    Cardiac murmur, unspecified    Mitral and aortic insufficiency        Patient's Body mass index is 36.33 kg/m². BMI is above normal parameters. Recommendations include: exercise counseling and nutrition counseling.      Jany Landaverde is a current cigarettes user.  She currently smokes 1 pack of cigarettes per day for a duration of 50 years. I have educated her on the risk of diseases from using tobacco products such as cancer, COPD and heart diease.     I advised her to quit and she is not willing to quit.    I spent 3  minutes counseling the patient.          Hermann Coronado MD  10/18/2019  5:19 PM

## 2020-01-01 ENCOUNTER — HOSPITAL ENCOUNTER (OUTPATIENT)
Dept: GENERAL RADIOLOGY | Facility: HOSPITAL | Age: 75
Discharge: HOME | End: 2020-03-16
Admitting: INTERNAL MEDICINE

## 2020-01-01 ENCOUNTER — APPOINTMENT (OUTPATIENT)
Dept: ONCOLOGY | Facility: HOSPITAL | Age: 75
End: 2020-01-01

## 2020-01-01 ENCOUNTER — DOCUMENTATION (OUTPATIENT)
Dept: ONCOLOGY | Facility: CLINIC | Age: 75
End: 2020-01-01

## 2020-01-01 ENCOUNTER — TELEMEDICINE - AUDIO (OUTPATIENT)
Dept: ONCOLOGY | Facility: CLINIC | Age: 75
End: 2020-01-01

## 2020-01-01 ENCOUNTER — OFFICE VISIT (OUTPATIENT)
Dept: PULMONOLOGY | Facility: CLINIC | Age: 75
End: 2020-01-01

## 2020-01-01 ENCOUNTER — HOSPITAL ENCOUNTER (OUTPATIENT)
Dept: RADIATION ONCOLOGY | Facility: HOSPITAL | Age: 75
Setting detail: RADIATION/ONCOLOGY SERIES
End: 2020-01-01

## 2020-01-01 ENCOUNTER — OFFICE VISIT (OUTPATIENT)
Dept: CARDIOLOGY | Facility: CLINIC | Age: 75
End: 2020-01-01

## 2020-01-01 ENCOUNTER — TELEPHONE (OUTPATIENT)
Dept: ONCOLOGY | Facility: CLINIC | Age: 75
End: 2020-01-01

## 2020-01-01 ENCOUNTER — TELEPHONE (OUTPATIENT)
Dept: PULMONOLOGY | Facility: CLINIC | Age: 75
End: 2020-01-01

## 2020-01-01 ENCOUNTER — HOSPITAL ENCOUNTER (OUTPATIENT)
Dept: CT IMAGING | Facility: HOSPITAL | Age: 75
Discharge: HOME | End: 2020-04-06
Admitting: INTERNAL MEDICINE

## 2020-01-01 ENCOUNTER — CONSULT (OUTPATIENT)
Dept: ONCOLOGY | Facility: CLINIC | Age: 75
End: 2020-01-01

## 2020-01-01 ENCOUNTER — CONSULT (OUTPATIENT)
Dept: RADIATION ONCOLOGY | Facility: HOSPITAL | Age: 75
End: 2020-01-01

## 2020-01-01 ENCOUNTER — HOSPITAL ENCOUNTER (OUTPATIENT)
Dept: PET IMAGING | Facility: HOSPITAL | Age: 75
Discharge: HOME | End: 2020-03-27
Admitting: INTERNAL MEDICINE

## 2020-01-01 VITALS
SYSTOLIC BLOOD PRESSURE: 150 MMHG | RESPIRATION RATE: 18 BRPM | BODY MASS INDEX: 37.31 KG/M2 | OXYGEN SATURATION: 94 % | HEIGHT: 61 IN | DIASTOLIC BLOOD PRESSURE: 96 MMHG | WEIGHT: 197.6 LBS | HEART RATE: 86 BPM | TEMPERATURE: 98.1 F

## 2020-01-01 VITALS
HEART RATE: 91 BPM | WEIGHT: 198.4 LBS | DIASTOLIC BLOOD PRESSURE: 90 MMHG | SYSTOLIC BLOOD PRESSURE: 150 MMHG | OXYGEN SATURATION: 94 % | BODY MASS INDEX: 36.51 KG/M2 | HEIGHT: 62 IN

## 2020-01-01 VITALS
DIASTOLIC BLOOD PRESSURE: 79 MMHG | WEIGHT: 196.5 LBS | RESPIRATION RATE: 18 BRPM | HEIGHT: 61 IN | BODY MASS INDEX: 37.1 KG/M2 | SYSTOLIC BLOOD PRESSURE: 144 MMHG | TEMPERATURE: 97.9 F | OXYGEN SATURATION: 92 % | HEART RATE: 93 BPM

## 2020-01-01 VITALS
BODY MASS INDEX: 36.29 KG/M2 | WEIGHT: 197.2 LBS | DIASTOLIC BLOOD PRESSURE: 74 MMHG | SYSTOLIC BLOOD PRESSURE: 146 MMHG | HEART RATE: 87 BPM | OXYGEN SATURATION: 93 % | HEIGHT: 62 IN

## 2020-01-01 VITALS
OXYGEN SATURATION: 93 % | WEIGHT: 195 LBS | TEMPERATURE: 99 F | RESPIRATION RATE: 20 BRPM | HEIGHT: 61 IN | HEART RATE: 81 BPM | SYSTOLIC BLOOD PRESSURE: 175 MMHG | BODY MASS INDEX: 36.82 KG/M2 | DIASTOLIC BLOOD PRESSURE: 85 MMHG

## 2020-01-01 VITALS — HEIGHT: 62 IN | BODY MASS INDEX: 36.25 KG/M2 | WEIGHT: 197 LBS

## 2020-01-01 VITALS
HEIGHT: 61 IN | OXYGEN SATURATION: 96 % | BODY MASS INDEX: 36.82 KG/M2 | SYSTOLIC BLOOD PRESSURE: 132 MMHG | WEIGHT: 195 LBS | DIASTOLIC BLOOD PRESSURE: 82 MMHG | HEART RATE: 101 BPM

## 2020-01-01 DIAGNOSIS — Z51.5 HOSPICE CARE: Primary | ICD-10-CM

## 2020-01-01 DIAGNOSIS — G89.29 CHRONIC PAIN OF BOTH SHOULDERS: ICD-10-CM

## 2020-01-01 DIAGNOSIS — M25.512 CHRONIC PAIN OF BOTH SHOULDERS: ICD-10-CM

## 2020-01-01 DIAGNOSIS — M25.511 CHRONIC PAIN OF BOTH SHOULDERS: ICD-10-CM

## 2020-01-01 DIAGNOSIS — C34.12 MALIGNANT NEOPLASM OF UPPER LOBE OF LEFT LUNG (HCC): Primary | ICD-10-CM

## 2020-01-01 DIAGNOSIS — C34.92 NON-SMALL CELL CANCER OF LEFT LUNG (HCC): Primary | ICD-10-CM

## 2020-01-01 DIAGNOSIS — E03.9 HYPOTHYROIDISM, UNSPECIFIED TYPE: ICD-10-CM

## 2020-01-01 DIAGNOSIS — R91.8 LUNG MASS: Primary | ICD-10-CM

## 2020-01-01 DIAGNOSIS — I08.0 MITRAL AND AORTIC INSUFFICIENCY: ICD-10-CM

## 2020-01-01 DIAGNOSIS — I73.9 PVD (PERIPHERAL VASCULAR DISEASE) (HCC): ICD-10-CM

## 2020-01-01 DIAGNOSIS — I25.10 ATHEROSCLEROSIS OF NATIVE CORONARY ARTERY OF NATIVE HEART WITHOUT ANGINA PECTORIS: Primary | ICD-10-CM

## 2020-01-01 DIAGNOSIS — C34.12 MALIGNANT NEOPLASM OF UPPER LOBE OF LEFT LUNG (HCC): ICD-10-CM

## 2020-01-01 DIAGNOSIS — R91.8 LUNG MASS: ICD-10-CM

## 2020-01-01 DIAGNOSIS — I35.0 NONRHEUMATIC AORTIC VALVE STENOSIS: ICD-10-CM

## 2020-01-01 DIAGNOSIS — Z51.5 HOSPICE CARE: ICD-10-CM

## 2020-01-01 LAB
CYTO UR: NORMAL
DEPRECATED RDW RBC AUTO: 44.1 FL (ref 37–54)
ERYTHROCYTE [DISTWIDTH] IN BLOOD BY AUTOMATED COUNT: 13.3 % (ref 12.3–15.4)
HCT VFR BLD AUTO: 46.7 % (ref 34–46.6)
HGB BLD-MCNC: 15.7 G/DL (ref 12–15.9)
INR PPP: 0.93 (ref 0.8–1.2)
LAB AP CASE REPORT: NORMAL
LAB AP NON-GYN SPECIMEN ADEQUACY: NORMAL
Lab: NORMAL
MCH RBC QN AUTO: 30.5 PG (ref 26.6–33)
MCHC RBC AUTO-ENTMCNC: 33.6 G/DL (ref 31.5–35.7)
MCV RBC AUTO: 90.7 FL (ref 79–97)
PATH REPORT.FINAL DX SPEC: NORMAL
PATH REPORT.GROSS SPEC: NORMAL
PLATELET # BLD AUTO: 344 10*3/MM3 (ref 140–450)
PMV BLD AUTO: 9.5 FL (ref 6–12)
PROTHROMBIN TIME: 12.3 SECONDS (ref 11.1–15.3)
RBC # BLD AUTO: 5.15 10*6/MM3 (ref 3.77–5.28)
WBC NRBC COR # BLD: 7.57 10*3/MM3 (ref 3.4–10.8)

## 2020-01-01 PROCEDURE — 99204 OFFICE O/P NEW MOD 45 MIN: CPT | Performed by: RADIOLOGY

## 2020-01-01 PROCEDURE — 99442 PR PHYS/QHP TELEPHONE EVALUATION 11-20 MIN: CPT | Performed by: INTERNAL MEDICINE

## 2020-01-01 PROCEDURE — 99202 OFFICE O/P NEW SF 15 MIN: CPT | Performed by: INTERNAL MEDICINE

## 2020-01-01 PROCEDURE — 99214 OFFICE O/P EST MOD 30 MIN: CPT | Performed by: INTERNAL MEDICINE

## 2020-01-01 PROCEDURE — G0463 HOSPITAL OUTPT CLINIC VISIT: HCPCS | Performed by: RADIOLOGY

## 2020-01-01 PROCEDURE — A9552 F18 FDG: HCPCS | Performed by: INTERNAL MEDICINE

## 2020-01-01 PROCEDURE — 88173 CYTOPATH EVAL FNA REPORT: CPT | Performed by: PATHOLOGY

## 2020-01-01 PROCEDURE — 99406 BEHAV CHNG SMOKING 3-10 MIN: CPT | Performed by: RADIOLOGY

## 2020-01-01 PROCEDURE — 71046 X-RAY EXAM CHEST 2 VIEWS: CPT

## 2020-01-01 PROCEDURE — 25010000002 FENTANYL CITRATE (PF) 100 MCG/2ML SOLUTION: Performed by: RADIOLOGY

## 2020-01-01 PROCEDURE — 99443 PR PHYS/QHP TELEPHONE EVALUATION 21-30 MIN: CPT | Performed by: INTERNAL MEDICINE

## 2020-01-01 PROCEDURE — 0 FLUDEOXYGLUCOSE F18 SOLUTION: Performed by: INTERNAL MEDICINE

## 2020-01-01 PROCEDURE — 85027 COMPLETE CBC AUTOMATED: CPT | Performed by: RADIOLOGY

## 2020-01-01 PROCEDURE — 78815 PET IMAGE W/CT SKULL-THIGH: CPT

## 2020-01-01 PROCEDURE — 77012 CT SCAN FOR NEEDLE BIOPSY: CPT

## 2020-01-01 PROCEDURE — 25010000002 MIDAZOLAM PER 1 MG: Performed by: RADIOLOGY

## 2020-01-01 PROCEDURE — 85610 PROTHROMBIN TIME: CPT | Performed by: RADIOLOGY

## 2020-01-01 PROCEDURE — G0463 HOSPITAL OUTPT CLINIC VISIT: HCPCS | Performed by: INTERNAL MEDICINE

## 2020-01-01 PROCEDURE — 88173 CYTOPATH EVAL FNA REPORT: CPT | Performed by: INTERNAL MEDICINE

## 2020-01-01 PROCEDURE — 99205 OFFICE O/P NEW HI 60 MIN: CPT | Performed by: INTERNAL MEDICINE

## 2020-01-01 RX ORDER — MORPHINE SULFATE 15 MG/1
15 TABLET, FILM COATED, EXTENDED RELEASE ORAL EVERY 8 HOURS
Qty: 90 TABLET | Refills: 0 | Status: SHIPPED | OUTPATIENT
Start: 2020-01-01 | End: 2020-01-01 | Stop reason: SDUPTHER

## 2020-01-01 RX ORDER — MORPHINE SULFATE 30 MG/1
30 TABLET, FILM COATED, EXTENDED RELEASE ORAL EVERY 8 HOURS
Qty: 90 TABLET | Refills: 0 | Status: SHIPPED | OUTPATIENT
Start: 2020-01-01

## 2020-01-01 RX ORDER — MORPHINE SULFATE 20 MG/ML
SOLUTION ORAL
Qty: 60 ML | Refills: 0 | Status: SHIPPED | OUTPATIENT
Start: 2020-01-01

## 2020-01-01 RX ORDER — MORPHINE SULFATE 15 MG/1
15 TABLET, FILM COATED, EXTENDED RELEASE ORAL 2 TIMES DAILY
Qty: 8 TABLET | Refills: 0 | Status: SHIPPED | OUTPATIENT
Start: 2020-01-01 | End: 2020-01-01 | Stop reason: SDUPTHER

## 2020-01-01 RX ORDER — HYDROCODONE BITARTRATE AND ACETAMINOPHEN 5; 325 MG/1; MG/1
1 TABLET ORAL EVERY 4 HOURS PRN
Qty: 24 TABLET | Refills: 0 | Status: SHIPPED | OUTPATIENT
Start: 2020-01-01 | End: 2020-01-01 | Stop reason: SDUPTHER

## 2020-01-01 RX ORDER — MORPHINE SULFATE 20 MG/ML
5 SOLUTION ORAL
Qty: 30 ML | Refills: 0 | Status: SHIPPED | OUTPATIENT
Start: 2020-01-01 | End: 2020-01-01

## 2020-01-01 RX ORDER — MIDAZOLAM HYDROCHLORIDE 1 MG/ML
INJECTION INTRAMUSCULAR; INTRAVENOUS
Status: DISPENSED
Start: 2020-01-01 | End: 2020-01-01

## 2020-01-01 RX ORDER — MORPHINE SULFATE 20 MG/ML
10 SOLUTION ORAL NIGHTLY
Qty: 30 ML | Refills: 0 | Status: SHIPPED | OUTPATIENT
Start: 2020-01-01 | End: 2020-01-01 | Stop reason: SDUPTHER

## 2020-01-01 RX ORDER — HYDROCODONE BITARTRATE AND ACETAMINOPHEN 5; 325 MG/1; MG/1
1 TABLET ORAL EVERY 4 HOURS PRN
Qty: 120 TABLET | Refills: 0 | Status: SHIPPED | OUTPATIENT
Start: 2020-01-01

## 2020-01-01 RX ORDER — MORPHINE SULFATE 30 MG/1
30 TABLET, FILM COATED, EXTENDED RELEASE ORAL EVERY 8 HOURS
Qty: 60 TABLET | Refills: 0 | Status: SHIPPED | OUTPATIENT
Start: 2020-01-01 | End: 2020-01-01 | Stop reason: SDUPTHER

## 2020-01-01 RX ORDER — MORPHINE SULFATE 20 MG/ML
5 SOLUTION ORAL EVERY 4 HOURS PRN
Qty: 30 ML | Refills: 0 | Status: SHIPPED | OUTPATIENT
Start: 2020-01-01 | End: 2020-01-01 | Stop reason: SDUPTHER

## 2020-01-01 RX ORDER — MORPHINE SULFATE 15 MG/1
15 TABLET, FILM COATED, EXTENDED RELEASE ORAL 2 TIMES DAILY
Qty: 60 TABLET | Refills: 0 | Status: SHIPPED | OUTPATIENT
Start: 2020-01-01 | End: 2020-01-01 | Stop reason: SDUPTHER

## 2020-01-01 RX ORDER — FENTANYL CITRATE 50 UG/ML
INJECTION, SOLUTION INTRAMUSCULAR; INTRAVENOUS
Status: DISPENSED
Start: 2020-01-01 | End: 2020-01-01

## 2020-01-01 RX ORDER — MIDAZOLAM HYDROCHLORIDE 1 MG/ML
INJECTION INTRAMUSCULAR; INTRAVENOUS
Status: COMPLETED | OUTPATIENT
Start: 2020-01-01 | End: 2020-01-01

## 2020-01-01 RX ORDER — FENTANYL CITRATE 50 UG/ML
INJECTION, SOLUTION INTRAMUSCULAR; INTRAVENOUS
Status: COMPLETED | OUTPATIENT
Start: 2020-01-01 | End: 2020-01-01

## 2020-01-01 RX ORDER — HYDROCODONE BITARTRATE AND ACETAMINOPHEN 5; 325 MG/1; MG/1
1 TABLET ORAL EVERY 4 HOURS PRN
Qty: 120 TABLET | Refills: 0 | Status: SHIPPED | OUTPATIENT
Start: 2020-01-01 | End: 2020-01-01 | Stop reason: SDUPTHER

## 2020-01-01 RX ADMIN — FLUDEOXYGLUCOSE F18 1 DOSE: 300 INJECTION INTRAVENOUS at 08:12

## 2020-01-01 RX ADMIN — FENTANYL CITRATE 25 MCG: 50 INJECTION, SOLUTION INTRAMUSCULAR; INTRAVENOUS at 11:19

## 2020-01-01 RX ADMIN — MIDAZOLAM HYDROCHLORIDE 0.5 MG: 2 INJECTION, SOLUTION INTRAMUSCULAR; INTRAVENOUS at 11:19

## 2020-03-16 NOTE — PROGRESS NOTES
Jany Landaverde is a 75 y.o. female.     Chief Complaint   Patient presents with   • Establish Care     with xray         History of Present Illness     This 75-year-old lady is here for evaluation of a lung mass.  She has had cough and left shoulder pain for several months.  She denies purulent or bloody sputum.  She has COPD, stroke post stroke, heart disease, back pain, high blood pressure, arthritis.  Surgical history include removal of GYN tumor and cataract surgery.  Medications please see her list.  Medication allergies unknown.  Family history is positive for multiple illnesses.  Social history has smoked for 55 years        The following portions of the patient's history were reviewed and updated as appropriate: allergies, current medications, past family history, past medical history, past social history, past surgical history and problem list.      Review of Systems   Constitutional: Negative for activity change, chills, fatigue, fever and unexpected weight change.   HENT: Positive for hearing loss. Negative for congestion, dental problem, ear discharge, ear pain, facial swelling, nosebleeds, postnasal drip, rhinorrhea, sinus pressure, sneezing, sore throat, tinnitus, trouble swallowing and voice change.    Eyes: Negative.  Negative for photophobia, pain, discharge, redness, itching and visual disturbance.   Respiratory: Positive for cough, wheezing and stridor. Negative for chest tightness and shortness of breath.    Cardiovascular: Negative for chest pain, palpitations and leg swelling.   Gastrointestinal: Negative for abdominal distention, abdominal pain and vomiting.   Endocrine: Negative.  Negative for cold intolerance, heat intolerance, polydipsia and polyphagia.   Genitourinary: Negative.  Negative for decreased urine volume, dysuria, enuresis, flank pain, frequency, hematuria and urgency.   Musculoskeletal: Positive for arthralgias, back pain and neck pain. Negative for gait problem,  "joint swelling and myalgias.   Skin: Negative for color change, pallor, rash and wound.   Allergic/Immunologic: Negative.  Negative for environmental allergies, food allergies and immunocompromised state.   Neurological: Negative.  Negative for dizziness, tremors, seizures, syncope, facial asymmetry, speech difficulty, weakness, light-headedness, numbness and headaches.   Hematological: Negative for adenopathy. Does not bruise/bleed easily.   Psychiatric/Behavioral: Negative for agitation, behavioral problems, confusion, decreased concentration, hallucinations and self-injury. The patient is not hyperactive.    All other systems reviewed and are negative.      /74 (BP Location: Left arm, Patient Position: Sitting, Cuff Size: Adult)   Pulse 87   Ht 157.5 cm (62\")   Wt 89.4 kg (197 lb 3.2 oz)   SpO2 93%   BMI 36.07 kg/m²   Physical Exam   Constitutional: She appears well-developed and well-nourished. No distress ( Dyspneic elderly white female).   HENT:   Head: Normocephalic.   Mouth/Throat: No oropharyngeal exudate.   Eyes: Pupils are equal, round, and reactive to light. Conjunctivae are normal. Right eye exhibits no discharge. Left eye exhibits no discharge. No scleral icterus.   Neck: Normal range of motion. Neck supple. No JVD present. No tracheal deviation present. No thyromegaly present.   Cardiovascular: Normal rate, regular rhythm, normal heart sounds and intact distal pulses. Exam reveals no gallop and no friction rub.   No murmur heard.  Pulmonary/Chest: Breath sounds normal. She is in respiratory distress. She has no wheezes. She has no rales. She exhibits no tenderness.   Abdominal: Bowel sounds are normal. She exhibits no distension and no mass. There is no tenderness. There is no guarding.   Musculoskeletal: She exhibits no tenderness or deformity.   Lymphadenopathy:     She has no cervical adenopathy.   Neurological: She is alert. She has normal reflexes. She displays normal reflexes. No " cranial nerve deficit. She exhibits normal muscle tone. Coordination normal.   Skin: Skin is warm and dry. No rash noted. She is not diaphoretic. No pallor.   Psychiatric: She has a normal mood and affect. Her behavior is normal. Judgment and thought content normal.     Chest x-ray revealed a large left lingular infiltrate, CT revealed a mass in this area    Assessment/Plan   Jany was seen today for establish care.    Diagnoses and all orders for this visit:    Lung mass  -     NM Pet Skull Base To Mid Thigh; Future    Chronic pain of both shoulders  -     NM Pet Skull Base To Mid Thigh; Future      Assessment left lingular lung mass and left shoulder pain, COPD    Plan PET scan return afterwards        This document has been produced with the assistance of [employer] dictation  This document has been electronically signed by Eric Mendoza MD on March 16, 2020 13:48

## 2020-04-02 NOTE — PROGRESS NOTES
"This 75-year-old lady has a mass in the left lung.  PET scan was positive.  Radiology has been called and is amenable to doing a needle lung biopsy.  She denies chest pain hemoptysis night sweats or weight loss.  She is mildly dyspneic    The following portions of the patient's history were reviewed and updated as appropriate: current medications, past family history, past medical history, past social history, past surgical history and problem list.      ROS    Constitutional-no night sweats weight loss headaches  GI no abdominal pain nausea or diarrhea  Neuro no seizure or neurologic deficits  Musculoskeletal no deformity or joint pain   no dysuria or hematuria  Skin no rash or other lesions  All other systems reviewed and were negative except for the above.    Physical Exam  /90   Pulse 91   Ht 157.5 cm (62\")   Wt 90 kg (198 lb 6.4 oz)   SpO2 94%   BMI 36.29 kg/m²   Vital signs as above  Pupils equally round and reactive to light and accommodation, neck no JVD or adenopathy.  Cardiovascular regular rhythm and rate no murmur or gallop.  Abdomen soft no organomegaly tenderness.  Extremities no clubbing cyanosis or edema.  No cervical adenopathy.  No skin rash.  Neurologic good strength bilaterally without deficits    Elderly dyspneic white female lungs reveal diminished breath sounds on the left    CT reveals a sizable mass left upper lobe which has activity on CT    Impression lung mass    Plan needle lung biopsy by radiology.  The risk and benefits were explained to the patient and accepted, family was present.  Patient will see me a few days after the labs        This document has been produced with the assistance of Dragon dictation  This document has been electronically signed by Eric Mendoza MD on April 2, 2020 11:15          "

## 2020-04-06 NOTE — PRE-PROCEDURE NOTE
Patient for CT directed lung biopsy. Procedure, risks , and benefits discussed with patient and patient  gave informed consent.  H&P dated 3/7/2020 reviewed with no changes.

## 2020-04-06 NOTE — POST-PROCEDURE NOTE
Pre-Op Diagnosis:  Left lung mass  Post-Op Diagnosis: Left lung mass  Procedure: CT directed FNA  Anesthesia: Local and 0.5 mg versed and 25 mcg  fentanyl  EBL: None  Specimens:   specimen  sent to LAB / PATH  Findings: Specimen sent, awaiting results  Complications: None  Disposition:  Patient tolerated the procedure well

## 2020-04-13 NOTE — TELEPHONE ENCOUNTER
Talked to patient and let her know that the results require a face to face talk with Dr. Mendoza.   Set up appointment for 9:45 Wednesday,   She wanted her sons to come in and Dr. Abarca allowed it.     She voiced understanding     ----- Message from Jaqui Singh sent at 4/10/2020  1:16 PM CDT -----  Dr. Mendoza patient  She said she had a biopsy on 04/06/2020 and would like someone to call her with the results.     Her phone number is [phone].     Thanks    Jaqui

## 2020-04-15 NOTE — PROGRESS NOTES
"This lady has a lung mass.  Bronchoscopy was suspicious for non-small cell lung cancer.  Subsequently a needle biopsy was done which revealed non-small cell.  She complains of pain in her left shoulder and clavicle.  She denies hemoptysis or shortness of breath    The following portions of the patient's history were reviewed and updated as appropriate: current medications, past family history, past medical history, past social history, past surgical history and problem list.      ROS    Constitutional-no night sweats weight loss headaches  GI no abdominal pain nausea or diarrhea  Neuro no seizure or neurologic deficits  Musculoskeletal no deformity or joint pain   no dysuria or hematuria  Skin no rash or other lesions  All other systems reviewed and were negative except for the above.      Physical Exam  /82   Pulse 101   Ht 154.9 cm (61\")   Wt 88.5 kg (195 lb)   SpO2 96%   BMI 36.84 kg/m²   Vital signs as above  Pupils equally round and reactive to light and accommodation, neck no JVD or adenopathy.  Cardiovascular regular rhythm and rate no murmur or gallop.  Abdomen soft no organomegaly tenderness.  Extremities no clubbing cyanosis or edema.  No cervical adenopathy.  No skin rash.  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

## 2020-04-16 NOTE — TELEPHONE ENCOUNTER
Followed up with pt today to discuss appt scheduling and education r/t new cancer diagnosis. Spoke with pt and sons yesterday via telephone and informed of my role as the nurse navigator in cancer care and my support services to pt and family. Stressed the importance of education and informed decision making and my assistance as needed to pt and family. Provided my contact information verbally and encouraged pt to reach out anytime to me with any concerns or questions. Pt son made arrangements to  written education surrounding diagnosis and treatment planning r/t non-small cell lung cancer. Printed NCCN Patient Guidelines for NSCLC and provided new pt folder for the cancer center. Pt son picked up contents this am and pt stated she was reviewing at present. Included my card with information and contact information as well for pt support. Pt requested all three of her sons to accompany her to appt. Explained current visitors restrictions that were in place due to Covid-19 crisis and pt was open to having one son with her for informed-decision making and utilizing [employer] on mobile phone with other two sons. Discussed this process with Dr. Pickett and he was agreeable. Discussed logistics of oncology appt tomorrow with pt and time. Encouraged pt to reach out anytime with concerns or questions. Pt stated understanding of discussion and denied any further concerns or questions today. Will f/u with pt as needed throughout cancer treatment course.

## 2020-04-17 PROBLEM — C34.12 MALIGNANT NEOPLASM OF UPPER LOBE OF LEFT LUNG (HCC): Status: ACTIVE | Noted: 2020-01-01

## 2020-04-17 NOTE — PROGRESS NOTES
New patient consultation. Medical oncology.  75 year old female presents for her initial visit with medical oncologist.  Pt. Newly diagnosed with non small cell lung cancer. Pt. Is accompanied by her son and one son is present via face time, in keeping with COVID restrictions in place.  Pt. Presents calm and easily engaged, rapport with pt and family easily established.    Oncology SW introduced self and explained role, support and ongoing availability/contact info provided.  Pt. Completed Distress screening and scored a #9 on distress, marking indicators of emotional stress and physical symptoms that were addressed by provider during visit.  Pt. /son verified understanding of the information that was provided during this visit. Pt. States her goal is to think about options and discuss with family. Pt. Indicated primary worry distress related to financial /medical bills that may occur. Pt. Has medicare primary and secondary with physicians mutual. Pt. States she has applied for financial assistance through [facility] and would like to know status, she is having difficulty reaching anyone due to current COVID availability of counselors. SW offered to follow up with financial navigator, RO Santillan who is working off campus and get back with pt. Pt. Was offered reassurance of supports to include her scope of coverage, pharmacy assistance if needed and financial ast. Program. Pt. Describes positive family support, three adult children, [employer] [place] and her friends as her supports. She shared emotions that are consistent with her adjustments with new cancer diagnosis and treatment planning. Pt. Denies history of psychiatric illness, she denies current symptoms of major mood disorder and presents optimistic and future oriented.  LCSW offered person centered therapeutic feedback by means of collecting history, emotional support, active listening, validation of emotions and clarification of feedback and recommendations for care.   Provided education related to oncology supports and services.    Follow up call to RO Santillan and financial assistance. Financial navigator to follow and support, pt given her number for contact.  Advised by FC that pt application has been received and is pending review. Above was relayed to pt this day by phone.

## 2020-04-19 NOTE — PROGRESS NOTES
Jany Landaverde  [id number]  1945      REASON FOR CONSULTATION:  Lung cancer  Provide an opinion on any further workup or treatment                             REQUESTING PHYSICIAN:  Eric Mendoza MD    RECORDS OBTAINED:  Records of the patients history including those obtained from the referring provider were reviewed and summarized in detail.      History of Present Illness     This is a pleasant 75-year-old female who was seen in consultation at the request of Dr. Mendoza for evaluation of newly diagnosed lung cancer.  Patient has past medical history of CVA, heart failure with preserved ejection fraction, hypertension, COPD, tobacco abuse.  Patient tells me that she has been having back pain on the left side since last November.  In addition she has been dealing with chronic cough and exertional shortness of breath as well.  She was initially treated for pneumonia however her symptoms progressively got worse.  Eventually she had a CT scan of chest performed on February 20 which showed 5.4 x 4.3 x 3.6 cm mass in the superior segment of lingula abutting the pleural surface along the anterolateral chest wall and the pericardium without pleural effusion or pericardial effusion.  It also showed enlarged left hilar and aorticopulmonary window lymph nodes enlargement.  The left hilar lymph node was causing some extrinsic compression of the left upper lobe and lingular airway.  Patient was subsequently evaluated by pulmonary.  PET scan on March 27, 2020 showed left perihilar conglomeration lymphadenopathy with abnormal FDG activity highly suspicious for metastatic lymphadenopathy.  In addition it showed left upper lobe anterior segment large mass lesion with abnormal FDG activity of 13.5 highly suspicious for lung neoplasm.  There was a left adrenal mass lesion with small foci of abnormal increased FDG activity with SUV of 3.91.  Patient underwent CT needle biopsy of lung on April 6, 2020 which showed  non-small cell lung cancer.  She has been referred to me for further evaluation.        Past Medical History:   Diagnosis Date   • Cardiac murmur, unspecified    • CHF (congestive heart failure) (CMS/HCC)    • COPD (chronic obstructive pulmonary disease) (CMS/HCC)    • Dyspnea    • GERD (gastroesophageal reflux disease)    • Hyperlipidemia    • Hypertension    • Hypothyroidism    • Mitral regurgitation    • Myocardial infarction (CMS/HCC)    • Nonrheumatic mitral valve disorder    • Palpitations    • PVD (peripheral vascular disease) (CMS/HCC)    • Stroke (CMS/HCC)         Past Surgical History:   Procedure Laterality Date   • ABDOMINAL SURGERY     • CARDIAC CATHETERIZATION     • CAROTID ENDARTERECTOMY     • CARPAL TUNNEL RELEASE     • CATARACT EXTRACTION     • HYSTERECTOMY     • TONSILLECTOMY          Current Outpatient Medications on File Prior to Visit   Medication Sig Dispense Refill   • aspirin 81 MG EC tablet Take 81 mg by mouth Daily.     • diltiaZEM CD (CARDIZEM CD) 180 MG 24 hr capsule Take 180 mg by mouth Daily.     • docusate sodium (COLACE) 50 MG capsule Take 50 mg by mouth Daily.     • fluticasone (FLONASE) 50 MCG/ACT nasal spray 2 sprays into each nostril Daily.     • furosemide (LASIX) 20 MG tablet Take 1 tablet by mouth Daily. 30 tablet 3   • hydrochlorothiazide (MICROZIDE) 12.5 MG capsule Take 12.5 mg by mouth Daily.     • ibuprofen (ADVIL,MOTRIN) 200 MG tablet Take 200 mg by mouth 2 (Two) Times a Day.     • irbesartan (AVAPRO) 75 MG tablet Take 75 mg by mouth Every Night.     • levothyroxine (SYNTHROID, LEVOTHROID) 50 MCG tablet Take 50 mcg by mouth Daily.     • magnesium oxide (MAGOX) 400 (241.3 MG) MG tablet tablet Take 400 mg by mouth 2 (Two) Times a Day.     • Probiotic Product (PROBIOTIC DAILY PO) Take  by mouth.     • simvastatin (ZOCOR) 40 MG tablet Take 40 mg by mouth Every Night.     • vitamin B-12 (CYANOCOBALAMIN) 2500 MCG sublingual tablet tablet Place  under the tongue Daily.       No  current facility-administered medications on file prior to visit.         ALLERGIES:  No Known Allergies     Social History     Socioeconomic History   • Marital status:      Spouse name: Not on file   • Number of children: Not on file   • Years of education: Not on file   • Highest education level: Not on file   Tobacco Use   • Smoking status: Current Every Day Smoker     Packs/day: 2.00     Years: 55.00     Pack years: 110.00     Types: Cigarettes   • Smokeless tobacco: Never Used   Substance and Sexual Activity   • Alcohol use: No   • Drug use: No   • Sexual activity: Defer        Family History   Problem Relation Age of Onset   • No Known Problems Mother    • Cancer Father    • Cancer Maternal Aunt    • Cancer Maternal Aunt         Review of Systems       CONSTITUTIONAL: fatigue + No weight loss, fever, chills, weakness.  HEENT: Eyes: No visual loss, blurred vision, double vision or yellow sclerae. Ears, Nose, Throat: No hearing loss, sneezing, congestion, runny nose or sore throat.  SKIN: No rash or itching.  CARDIOVASCULAR: No chest pain, chest pressure or chest discomfort. No palpitations or edema.  RESPIRATORY: exertional SOB + smoker's cough +   GASTROINTESTINAL: No anorexia, nausea, vomiting or diarrhea. No abdominal pain or blood.  GENITOURINARY: Negative for urgency, frequency or dysuria.   NEUROLOGICAL: No headache, dizziness, syncope, paralysis, ataxia, numbness or tingling in the extremities. No change in bowel or bladder control.  MUSCULOSKELETAL: chronic joint pain + .  HEMATOLOGIC: No anemia, bleeding or bruising.  LYMPHATICS: No enlarged nodes. No history of splenectomy.  PSYCHIATRIC: No history of depression or anxiety.  ENDOCRINOLOGIC: No reports of sweating, cold or heat intolerance. No polyuria or polydipsia.  ALLERGIES: No history of asthma, hives, eczema or rhinitis.      Objective     Vitals:    04/17/20 1001 04/17/20 1003 04/17/20 1017   BP: (!) 182/84 150/96  Comment: manually   "  Pulse: 86     Resp: 18     Temp: 98.1 °F (36.7 °C)     SpO2: (!) 88%  Comment: when first entering room, after a few seconds O2 up to 93% 93% 94%   Weight: 89.6 kg (197 lb 9.6 oz)     Height: 154.9 cm (61\")       Current Status 4/17/2020   ECOG score 1       Physical Exam      GENERAL: Alert, awake, oriented.  Well dressed.  Not in apparent distress. Vitals as above.   HEAD: Normocephalic, atraumatic.   EYES: PERRL, EOMI.  vision is grossly intact.  NECK: Supple, no adenopathy or thyromegaly.   THROAT: Normal oral cavity and pharynx. No inflammation, swelling, exudate, or lesions.  CARDIAC: Normal S1 and S2. Murmur + . Rhythm is regular.  Extremities are warm and well perfused.   LUNGS: Clear to auscultation and percussion without rales, rhonchi, wheezing or diminished breath sounds.  ABDOMEN: Positive bowel sounds. Soft, nondistended, nontender. No guarding or rebound. No masses.  BACK:  No bony tenderness.   EXTREMITIES: arthritis involving multiple joints + .  Peripheral pulses intact. No varicosities.  SKIN: No rash or bruising.  NEUROLOGICAL: Grossly non-focal exam. No focal weakness. Gait: Normal.   PSYCH: Mood and affect normal. No hallucination or suicidal thoughts.   LYMPHATIC: No cervical, supraclavicular or axillary adenopathy.   .       RECENT LABS:Independently reviewed and summarized.  Hematology WBC   Date Value Ref Range Status   04/06/2020 7.57 3.40 - 10.80 10*3/mm3 Final     RBC   Date Value Ref Range Status   04/06/2020 5.15 3.77 - 5.28 10*6/mm3 Final     Hemoglobin   Date Value Ref Range Status   04/06/2020 15.7 12.0 - 15.9 g/dL Final     Hematocrit   Date Value Ref Range Status   04/06/2020 46.7 (H) 34.0 - 46.6 % Final     Platelets   Date Value Ref Range Status   04/06/2020 344 140 - 450 10*3/mm3 Final          Imaging (independently reviewed and summarized):     PET scan from 3/27/20 reviewed.   Showed large left upper lobe mass with bulky left perihilar lymphadenopathy.   Left adrenal mass " with small foci abnormal FDG activity with SUV of 3.91.   No prior comparison available.     Pathology (result reviewed):   Final Diagnosis   Lung, Left Upper Lobe, Fine Needle Aspiration:  Positive for Malignant Cells Consistent with Non-small cell carcinoma.         I have reviewed old records and summarized them in HPI as well as assessment and plan section of this note.     Jany Landaverde reports a pain score of 0.  Given her pain assessment as noted, treatment options were discussed and the following options were decided upon as a follow-up plan to address the patient's pain: continuation of current treatment plan for pain.    PHQ depression screen: negative.     Advance Care Planning   ACP discussion was held with the patient during this visit. Patient has an advance directive in EMR which is still valid.         Diagnosis:   (1) Non-small cell lung cancer, left upper lobe   (2) CVA   (3) Hypertension   (4) Hypothyroidism     All are new diagnosis/problems for her.     Assessment/Plan     (1) Non-small cell lung cancer, left upper lobe     Patient with new diagnosis of non-small cell lung cancer involving left upper lobe with bulky hilar adenopathy.    Result of PET scan were shared with the patient and family.  Case was reviewed with interventional radiology.  Patient appears to at least have clinical stage II disease (stage III remains possibility as mediastinal involvement can't be ruled out due to rather bulky adenopathy).  Even though adrenal mass was slightly PET avid she does have history of bulky adrenal tumor which are likely benign even on previous imaging.  Case was reviewed with Dr. Faith who did not feel that adrenal lesion is easily amenable to biopsy.      Discussed diagnosis, prognosis and potential treatment options at length with the patient and family.  Patient has multiple health issues including history of CVA, diastolic heart failure, peripheral vascular disease, advanced  COPD/emphysema and ongoing tobacco abuse.  She gets significantly short of breath after walking 150-200 feet.  Due to her other comorbidities and significant COPD/emphysema I do not believe she is a surgical candidate.    Her treatment options would include concurrent chemoradiation followed by consolidative durvalumab versus definitive radiation therapy to chest versus palliative chemotherapy/immunotherapy.    Potential side effect associated with chemotherapy, radiation therapy discussed at length.    Discussed that her lung cancer is potentially curable with concurrent chemoradiation followed by 1 year of adjuvant durvalumab however she is quite concerned about potential side effects and is not interested in this option.    However, she does want to discuss the case with her family further before making any treatment recommendation.    I do recommend repeat biopsy as the initial biopsy specimen was somewhat limited and we are unable to perform additional ancillary testing such as PD-L1, EGFR, ALK, ROS on tumor specimen.     Patient tells me she will discuss things with her family before making any treatment recommendations.        (2) CVA: On aspirin. Stable.     (3) Hypertension: Patient blood pressure is elevated in the clinic. She reports compliance with antihypertensive medications.  Instructed to check blood pressure at home and report to us or her primary care provider if persistently elevated blood pressure at home.  Discussed long-term hazards of uncontrolled high blood pressure which could include stroke, heart disease and kidney disease.  Educated and counseled.      (4) Hypothyroidism: Stable on synthroid.       I have spent > 60 minutes times face to face with the patient and more than 50% of time was spent on counseling/coordinating care.     Thank you for involving me in Ms Landaverde's care.     Please call us if any questions/concerns.     Piyush Johnson MD   Hematology Oncology

## 2020-05-01 NOTE — PROGRESS NOTES
Telehealth visit:     Visit Consent  You have chosen to receive care through the use of telemedicine.  Telemedicine enables physicians at different locations to provide safe, effective, and convenient care through the use of technology.  As with any healthcare service, there are risks associated with the use of telemedicine, including equipment failure, poor connections, and  issues.     Do you understand the risks and benefits of telemedicine as I explained them to you? Yes  Have your questions regarding telemedicine been answered? Yes  Do you consent to the use of telemedicine in your medical care today? Yes.          CC: follow up for lung cancer     Interim history:     Ms Landaverde was seen in follow up for lung cancer.   She had a second opinion at [facility].   She is interested in seeing radiation oncology.   We again discussed her treatment options at length -       Past Medical History, Past Surgical History, Social History, Family History have been reviewed and are without significant changes except as mentioned.    Review of Systems   CONSTITUTIONAL: fatigue + No weight loss, fever, chills, weakness.  HEENT: Eyes: No visual loss, blurred vision, double vision or yellow sclerae. Ears, Nose, Throat: No hearing loss, sneezing, congestion, runny nose or sore throat.  SKIN: No rash or itching.  CARDIOVASCULAR: No chest pain, chest pressure or chest discomfort. No palpitations or edema.  RESPIRATORY: exertional SOB + smoker's cough +   GASTROINTESTINAL: No anorexia, nausea, vomiting or diarrhea. No abdominal pain or blood.  GENITOURINARY: Negative for urgency, frequency or dysuria.   NEUROLOGICAL: No headache, dizziness, syncope, paralysis, ataxia, numbness or tingling in the extremities. No change in bowel or bladder control.  MUSCULOSKELETAL: chronic joint pain + .  HEMATOLOGIC: No anemia, bleeding or bruising.  LYMPHATICS: No enlarged nodes. No history of splenectomy.  PSYCHIATRIC: No  history of depression or anxiety.  ENDOCRINOLOGIC: No reports of sweating, cold or heat intolerance. No polyuria or polydipsia.  ALLERGIES: No history of asthma, hives, eczema or rhinitis.    Medications:  The current medication list was reviewed in the EMR    ALLERGIES:  No Known Allergies    Objective      There were no vitals filed for this visit.  Current Status 5/1/2020   ECOG score 1           RECENT LABS:Independently reviewed and summarized.  Hematology WBC   Date Value Ref Range Status   04/06/2020 7.57 3.40 - 10.80 10*3/mm3 Final     RBC   Date Value Ref Range Status   04/06/2020 5.15 3.77 - 5.28 10*6/mm3 Final     Hemoglobin   Date Value Ref Range Status   04/06/2020 15.7 12.0 - 15.9 g/dL Final     Hematocrit   Date Value Ref Range Status   04/06/2020 46.7 (H) 34.0 - 46.6 % Final     Platelets   Date Value Ref Range Status   04/06/2020 344 140 - 450 10*3/mm3 Final          Imaging (independently reviewed and summarized):      PET scan from 3/27/20 reviewed.   Showed large left upper lobe mass with bulky left perihilar lymphadenopathy.   Left adrenal mass with small foci abnormal FDG activity with SUV of 3.91.   No prior comparison available.      Pathology (result reviewed):   Final Diagnosis   Lung, Left Upper Lobe, Fine Needle Aspiration:  Positive for Malignant Cells Consistent with Non-small cell carcinoma.     Diagnosis:   (1) Non-small cell lung cancer, left upper lobe     Assessment/Plan     Patient with new diagnosis of non-small cell lung cancer involving left upper lobe with bulky hilar adenopathy.     Patient appears to at least have clinical stage II disease (stage III remains possibility as mediastinal involvement can't be ruled out due to rather bulky adenopathy).  Even though adrenal mass was slightly PET avid she does have history of adrenal tumor which are likely benign even on previous imaging.  Case was reviewed with Dr. Faith who did not feel that adrenal lesion is easily amenable to  biopsy.     Discussed diagnosis, prognosis and potential treatment options at length with the patient and family.  Patient has multiple health issues including history of CVA, diastolic heart failure, peripheral vascular disease, advanced COPD/emphysema and ongoing tobacco abuse.  She gets significantly short of breath after walking 150-200 feet.  Due to her other comorbidities and significant COPD/emphysema I do not believe she is a surgical candidate.     Her treatment options would include concurrent chemoradiation followed by consolidative durvalumab versus definitive radiation therapy to chest versus palliative chemotherapy/immunotherapy.     Potential side effect associated with chemotherapy, radiation therapy discussed at length.     Discussed that her lung cancer is potentially curable with concurrent chemoradiation followed by 1 year of adjuvant durvalumab.     Patient was to see radiation oncology to discuss her treatment options and tells me she will let us know about her decision when ready.     Recommendations:   - Recommend referral to radiation oncology.   - Discussed treatment options, prognosis at length.       Patient tells me she will let us know about her treatment choices on phone after discussion with radiation oncology.     This visit has been rescheduled as a phone visit to comply with patient safety concerns in accordance with CDC recommendations. Total time of discussion was 15 minutes.    Piyush Johnson MD     5/1/2020      CC:

## 2020-05-05 NOTE — PROGRESS NOTES
New Patient Visit       Patient: Jany Landaverde   YOB: 1945   Medical Record Number: [id number]   Date of Visit:  May 6, 2020   Primary Diagnosis: Stage IIIA (T3 N1 M0) NSCLC of the left lingula.  ICD 10 Code: C34.12                                                                                        History of Present Illness: Ms. Jany Landaverde is a 75-year-old white female with a history of CAD, PVD, HTN, GERD, tobacco abuse, COPD, and a past history of a benign bulky adrenal tumor (excised at [facility] in 2002), now with recently diagnosed Stage II/III NSCLC of the EVY lung.  She presents to our clinic today to discuss radiation therapy recommendations.    Ms. Landaverde presented to Dr. Torsten Chappell, her PCP, on 1/28/2020 complaining of left shoulder, chest wall, and neck pain.  Shoulder x-rays revealed a probable synovial osteochondroma in the left shoulder joint, with no acute abnormalities.  Plain films of the cervical spine revealed moderate severity degenerative changes in C4-C7.  A chest x-ray revealed pneumonia involving the superior segment of the lingula.  The patient was subsequently started on antibiotics.       A repeat chest x-ray on 2/11/2020 revealed no significant improvement in the left lingular pneumonia.     A CT of the chest on 2/20/2020 revealed a 5.4 x 4.3 x 3.6 cm mass in the superior segment of the lingula, abutting the pleural surface along the anterior lateral chest wall and the pericardium, without pleural effusion or pericardial effusion.  Enlarged left hilar and aortopulmonary window lymph nodes were also noted.  A 6.6 cm lipoma was noted between the left and right atria, with no obstruction of flow.     The patient was referred to Dr. Eric Mendoza, where she was seen in consultation on 3/16/2020.  A chest x-ray at that time revealed a persistent 5.3 x 4.6 cm mass in the left midlung, slightly increased in size from 1/28/2020.     PET/CT on 3/27/2020 revealed  hypermetabolic activity in a 4.3 x 4.1 cm mass in the left upper lobe lung (SUV 13.5), in a left perihilar conglomeration of lymphadenopathy (SUV 9.11), and in a left adrenal gland mass (SUV 3.91).     A CT-guided needle biopsy (FNA) on 4/6/2020 was positive for malignant cells consistent with non-small cell carcinoma.     The patient was seen in consultation by Dr. Pickett on 4/17/2020.  Dr. Pickett felt that the patient had at least Stage II disease, and possibly Stage III, depending on mediastinal involvement of lymphadenopathy.  Dr. Pickett did not feel that she was a surgical candidate due to her comorbidities, and recommended concurrent chemoradiation therapy followed by consolidative durvalumab.  Definitive radiation therapy only and palliative chemotherapy/immunotherapy were also discussed.      The patient wished to have a second opinion, and was seen in consultation by Dr. Andriy Hubbard ([facility] oncologist) on 4/28/2020.  Dr. Hubbard also felt that it was appropriate to be aggressive with treatment, and recommended concurrent chemo/XRT followed by immunotherapy.     The patient was seen via telehealth by Dr. Pickett on 5/1/2020, at which time she informed him that she wished to meet with radiation oncology before making a final decision regarding her treatment options.     The patient presents to our clinic today to discuss radiation therapy recommendations.    This is a new problem to me.  (Old medical records were requested, reviewed, and summarized in the HPI)    Review of Systems   Constitutional: Positive for activity change and fatigue.   Respiratory: Positive for cough and shortness of breath.    Cardiovascular: Positive for chest pain.   Gastrointestinal: Positive for constipation.   Musculoskeletal:        Left shoulder pain down arm & up neck      All other systems reviewed and are negative.    Past Medical History:   Diagnosis Date   • Cardiac murmur, unspecified    • CHF (congestive heart  failure) (CMS/HCC)    • COPD (chronic obstructive pulmonary disease) (CMS/HCC)    • Dyspnea    • GERD (gastroesophageal reflux disease)    • Hyperlipidemia    • Hypertension    • Hypothyroidism    • Lung cancer (CMS/HCC)    • Mitral regurgitation    • Myocardial infarction (CMS/HCC)    • Nonrheumatic mitral valve disorder    • Palpitations    • PVD (peripheral vascular disease) (CMS/HCC)    • Stroke (CMS/HCC)         Past Surgical History:   Procedure Laterality Date   • ABDOMINAL SURGERY     • CARDIAC CATHETERIZATION     • CAROTID ENDARTERECTOMY     • CARPAL TUNNEL RELEASE     • CATARACT EXTRACTION     • HYSTERECTOMY     • TONSILLECTOMY        Family History   Problem Relation Age of Onset   • Breast cancer Mother    • Cancer Father    • Cancer Maternal Aunt    • Breast cancer Maternal Aunt    • Cancer Maternal Aunt    • Breast cancer Maternal Aunt    • Lung cancer Cousin         Social History     Socioeconomic History   • Marital status:      Spouse name: Not on file   • Number of children: 4   • Years of education: 12   • Highest education level: Not on file   Occupational History   • 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     Allergies:  Patient has no known allergies.     Prior to Admission medications    Medication Sig Start Date End Date Taking? Authorizing Provider   aspirin 81 MG EC tablet Take 81 mg by mouth Daily.   Yes Provider, MD Tal   diltiaZEM CD (CARDIZEM CD) 180 MG 24 hr capsule Take 180 mg by mouth Daily.   Yes Provider, MD Tal   docusate sodium (COLACE) 50 MG capsule Take 50 mg by mouth Daily.   Yes Provider  MD Tal   fluticasone (FLONASE) 50 MCG/ACT nasal spray 2 sprays into each nostril Daily.   Yes Tal French MD   furosemide (LASIX) 20 MG tablet Take 1 tablet by mouth Daily. 8/2/17  Yes Hermann Coronado MD   hydrochlorothiazide (MICROZIDE) 12.5 MG capsule Take 12.5 mg by mouth Daily.   Yes Tal French MD   ibuprofen (ADVIL,MOTRIN) 200 MG tablet Take 200 mg by mouth 2 (Two) Times a Day.   Yes Tal French MD   irbesartan (AVAPRO) 75 MG tablet Take 75 mg by mouth Every Night.   Yes Tal French MD   levothyroxine (SYNTHROID, LEVOTHROID) 50 MCG tablet Take 50 mcg by mouth Daily.   Yes Tal French MD   magnesium oxide (MAGOX) 400 (241.3 MG) MG tablet tablet Take 400 mg by mouth 2 (Two) Times a Day.   Yes Tal French MD   Probiotic Product (PROBIOTIC DAILY PO) Take  by mouth.   Yes Tal French MD   simvastatin (ZOCOR) 40 MG tablet Take 40 mg by mouth Every Night.   Yes Tal French MD   vitamin B-12 (CYANOCOBALAMIN) 2500 MCG sublingual tablet tablet Place  under the tongue Daily.   Yes Tal French MD      Pain:(on a scale of 0-10)    Pain Score    05/05/20 0944   PainSc:   2   PainLoc: Shoulder  Comment: left collar bone        Jany Landaverde reports a pain score of 2.  Given her pain assessment as noted, treatment options were discussed and the following options were decided upon as a follow-up plan to address the patient's pain: continuation of current treatment plan for pain.       Quality of Life:   KPS: 80 - Restricted Physical Activity  ECOG: (1) Restricted in physically strenuous activity, ambulatory and able to do work of light nature    Advanced Care Plan: N   Advance Care Planning   ACP discussion was held with the patient during this visit. Patient has an advance directive (not in EMR), copy requested.     PHQ-9 Depression Screening:    Little interest or pleasure in doing things? 1   Feeling down,  "depressed, or hopeless? 1   Trouble falling or staying asleep, or sleeping too much? 3   Feeling tired or having little energy? 3   Poor appetite or overeating? 1   Feeling bad about yourself - or that you are a failure or have let yourself or your family down? 0   Trouble concentrating on things, such as reading the newspaper or watching television? 2   Moving or speaking so slowly that other people could have noticed? Or the opposite - being so fidgety or restless that you have been moving around a lot more than usual? 2   Thoughts that you would be better off dead, or of hurting yourself in some way? 0   PHQ-9 Total Score 13   If you checked off any problems, how difficult have these problems made it for you to do your work, take care of things at home, or get along with other people? Somewhat difficult    PHQ-9 Total Score: 13     Patient screened positive for depression based on a PHQ-9 score of  on . Follow-up recommendations include: Referral to Social Work.       Physical Examination:  Vitals:    Vitals:    05/05/20 0944   BP: 144/79   Pulse: 93   Resp: 18   Temp: 97.9 °F (36.6 °C)   SpO2: 92%      Height: 154.9 cm (61\")  Weight: Weight: 89.1 kg (196 lb 8 oz)   Body mass index is 37.13 kg/m².     Constitutional: The patient is a well-developed, well-nourished white female  in no acute distress.  Alert and oriented ×3.  HEENT: Atraumatic. Normocephalic. No abnormalities noted.  Lymphatics: No cervical, supraclavicular, or axillary lymphadenopathy is palpated.  CV: Regular rate and rhythm.  No murmurs, rubs, or gallops are appreciated.  Respiratory: Distant breath sounds lungs clear to auscultation.  Breath sounds equal bilaterally.  GI: Abdomen soft, nontender, nondistended, with no hepatosplenomegaly or masses palpated.  Extremities: No clubbing, cyanosis, or edema.  Neurologic: Cranial nerves II through XII are grossly intact, with no focal neurological deficits noted on exam.  Psychiatric: Alert and " oriented x3. Normal affect, with no anxiety or depression noted.    Radiographs :    CT chest with contrast   2/20/2020  [facility]  Result Impression   1.  3.6 x 4.3 x 5.4 cm mass in the superior segment of the lingula that abuts the pleural surface along the anterolateral chest wall and the pericardium without pleural effusion or pericardial effusion.  2.  Enlarged left hilar and aortopulmonary window lymph nodes.  3.  5.1 x 6.1 x 6.6 cm irregular lipoma between the left and right atrium; despite the large size and dramatic appearance this may be incidental to current problems and there is no obstruction of the inflowing or outflowing blood vessels.  The lowest portion of this was present on the January 2016 CT scan and there is no pericardial fluid.  4.  Tumor needs to be excluded and the large lingular mass may have areas of central necrosis.  A PET scan is recommended and would be helpful in guiding biopsy efforts of the mass.     Result Narrative   Indication:  Lingular pneumonia, left chest pain.  CT, Chest with 100 mL Omnipaque:  In the superior segment of the lingula, there is a 3.6 x 4.3 x 5.4 cm mass that has a homogeneous relative low density that is greater than serous fluid and less than muscle.  There are large aortopulmonary window nodes and left hilar nodes with the nodes in the left hilum extrinsically causing some compression of the upper lobe and lingular airways.  The lingular mass abuts the pericardium and may be invading the pericardium, but there is no pericardial fluid.  There is also mild abutment of the anterolateral pleural surface without pleural effusion.  Immediately adjacent to the lingular mass is a 6.0 mm nodule that has similar characteristics.  There is a 5.1 x 6.1 x 6.6 cm irregular lipoma between the right atrium and the left atrium with anterior displacement of the right atrium.  The lowest portion of this was seen on the January 2016 CT of the abdomen.  No bone  destruction.  There are numerous nodules in the adrenal glands that are unchanged from the January 2016 study.  What is seen of the liver has a normal appearance.     Nuclear medicine PET skull base to mid thigh/CT   (3/27/20)   FINDINGS:  --------------   HEAD and NECK:     - Scintigraphic:  physiologic distribution of radiotracer     - Anatomic:  no discernable pathologic findings   THORAX:     - Scintigraphic:  Left perihilar conglomeration lymphadenopathy  with abnormal FDG activity with SUV of 9.11. Left upper lobe  anterior segment large 4.3 x 4.1 cm mass lesion with associated  abnormal FDG activity with SUV of 13.5. Otherwise physiologic  distribution radiotracer.     - Anatomic: Please see above   ABDOMEN:    - Scintigraphic:  Left adrenal gland mass lesion which  demonstrates peripheral small foci of abnormal increased FDG  activity with SUV of 3.91. Otherwise bilateral adrenal glands  demonstrate hypodense nodules which are stable and most  consistent with benign adrenal adenomas. Otherwise physiologic  distribution radiotracer.     - Anatomic:  no discernable pathologic findings except for  findings described above.   PELVIS:    - Scintigraphic:  physiologic distribution of radiotracer     - Anatomic:  no discernable pathologic findings   OSSEOUS / MISC:    - Scintigraphic:  physiologic distribution of radiotracer    - Anatomic:  no discernable pathologic findings     IMPRESSION:  CONCLUSION:  1.  Left perihilar conglomeration lymphadenopathy with abnormal  FDG activity with SUV of 9.11. This is highly suspicious for  metastatic lymphadenopathy.  2.Left upper lobe anterior segment large 4.3 x 4.1 cm mass lesion  with associated abnormal FDG activity with SUV of 13.5. This is  highly suspicious for primary lung neoplasm.  3.Left adrenal gland mass lesion which demonstrates peripheral  small foci of abnormal increased FDG activity with SUV of 3.91.  These findings would be suspicious for early metastatic  disease.  4. Remainder of nuclear medicine PET scan unremarkable.      Pathology:     Final Diagnosis   (4/6/2020)   Lung, Left Upper Lobe, Fine Needle Aspiration:  Positive for Malignant Cells Consistent with Non-small cell carcinoma.       Labs:   Lab Results   Component Value Date    BUN 16 03/15/2019    CREATININE 0.9 03/15/2019    K 4.7 03/15/2019    CALCIUM 10.4 03/15/2019    ALBUMIN 3.4 03/15/2019    AST 16 03/15/2019    ALT 24 (L) 03/15/2019      WBC   Date Value Ref Range Status   04/06/2020 7.57 3.40 - 10.80 10*3/mm3 Final     Hemoglobin   Date Value Ref Range Status   04/06/2020 15.7 12.0 - 15.9 g/dL Final     Hematocrit   Date Value Ref Range Status   04/06/2020 46.7 (H) 34.0 - 46.6 % Final     Platelets   Date Value Ref Range Status   04/06/2020 344 140 - 450 10*3/mm3 Final         ASSESSMENT/PLAN: Ms. Jany Landaverde is a 75-year-old white female with multiple comorbidities, now with recently diagnosed Stage IIIA NSCLC of the left lingula.  I agree with Dr. Pickett and Dr. Hubbard that the patient could benefit from a definitive course of concurrent chemoradiation therapy.  The NCCN guidelines as well as the risks, benefits, and rationale for definitive radiation therapy were discussed at length with the patient and her son.  They voiced understanding and wish to further contemplate their treatment options, and discussed with other family members, before making a final treatment decision.  They plan to notify us as soon as a decision has been made.  If they wish to proceed with radiation therapy, we plan to deliver 6400 cGy in 32 fractions, utilizing 3D conformal radiation therapy, possibly along with concurrent chemotherapy per Dr. Pickett.    Sincerely,    Chico Herring MD  Radiation Oncology    Electronically signed by Chico Herring MD 5/5/2020 15:52     cc: Dr. Hernandez Hubbard ([facility]  oncologist)

## 2020-05-08 NOTE — PROGRESS NOTES
Jany Landaverde  75 y.o. female      1. Atherosclerosis of native coronary artery of native heart without angina pectoris    2. Mitral and aortic insufficiency    3. Nonrheumatic aortic valve stenosis    4. Malignant neoplasm of upper lobe of left lung (CMS/HCC)    5. PVD (peripheral vascular disease) (CMS/HCC)        History of Present Illness;  This visit has been rescheduled as a phone visit to comply with patient safety concerns in accordance with CDC recommendations. Total time of discussion was 27 minutes.    You have chosen to receive care through a telephone visit. Do you consent to use a telephone visit for your medical care today? Yes    Jany Landaverde is being assessed for the above-stated cardiac issues.  She was evaluated for cough and dyspnea in February of this year and further work-up included a  CT scan of chest performed on February 20 which showed 5.4 x 4.3 x 3.6 cm mass in the superior segment of lingula abutting the pleural surface along the anterolateral chest wall and the pericardium without pleural effusion or pericardial effusion.  It also showed enlarged left hilar and aorticopulmonary window lymph nodes enlargement.  The left hilar lymph node was causing some extrinsic compression of the left upper lobe and lingular airway.  Patient was subsequently evaluated by pulmonary.  PET scan on March 27, 2020 showed left perihilar conglomeration lymphadenopathy with abnormal FDG activity highly suspicious for metastatic lymphadenopathy.  In addition it showed left upper lobe anterior segment large mass lesion with abnormal FDG activity of 13.5 highly suspicious for lung neoplasm.  There was a left adrenal mass lesion with small foci of abnormal increased FDG activity with SUV of 3.91.  Patient underwent CT needle biopsy of lung on April 6, 2020 which showed non-small cell lung cancer.  She has followed up with Dr. Johnson and has been discussing treatment options which includes radiation  therapy and chemotherapy.  Patient is considering her options.    Echocardiogram in April 2019 showed:  · Left ventricular wall thickness is consistent with mild concentric hypertrophy.  · Estimated EF = 61%.  · Left ventricular systolic function is normal.  · Left ventricular diastolic dysfunction (grade I) consistent with impaired relaxation.  · Left atrial cavity size is moderately dilated.  · The valve was poorly visualized but appears trileaflet. The aortic valve is abnormal in structure. No significant aortic valve regurgitation is present. No hemodynamically significant aortic valve stenosis is present.    The patient was unable to check her blood pressure or heart rate at home today.  She does not have the equipment.  SUBJECTIVE    No Known Allergies      Past Medical History:   Diagnosis Date   • Cardiac murmur, unspecified    • CHF (congestive heart failure) (CMS/HCC)    • COPD (chronic obstructive pulmonary disease) (CMS/HCC)    • Dyspnea    • GERD (gastroesophageal reflux disease)    • Hyperlipidemia    • Hypertension    • Hypothyroidism    • Lung cancer (CMS/HCC)    • Mitral regurgitation    • Myocardial infarction (CMS/HCC)    • Nonrheumatic mitral valve disorder    • Palpitations    • PVD (peripheral vascular disease) (CMS/HCC)    • Stroke (CMS/HCC)          Past Surgical History:   Procedure Laterality Date   • ABDOMINAL SURGERY     • CARDIAC CATHETERIZATION     • CAROTID ENDARTERECTOMY     • CARPAL TUNNEL RELEASE     • CATARACT EXTRACTION     • HYSTERECTOMY     • TONSILLECTOMY           Family History   Problem Relation Age of Onset   • Breast cancer Mother    • Cancer Father    • Cancer Maternal Aunt    • Breast cancer Maternal Aunt    • Cancer Maternal Aunt    • Breast cancer Maternal Aunt    • Lung cancer Cousin          Social History     Socioeconomic History   • Marital status:      Spouse name: Not on file   • Number of children: 4   • Years of education: 12   • Highest education  "level: Not on file   Occupational History   • 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     • diltiaZEM CD (CARDIZEM CD) 180 MG 24 hr capsule Take 180 mg by mouth Daily.     • docusate sodium (COLACE) 50 MG capsule Take 50 mg by mouth Daily.     • fluticasone (FLONASE) 50 MCG/ACT nasal spray 2 sprays into each nostril Daily.     • furosemide (LASIX) 20 MG tablet Take 1 tablet by mouth Daily. 30 tablet 3   • hydrochlorothiazide (MICROZIDE) 12.5 MG capsule Take 12.5 mg by mouth Daily.     • ibuprofen (ADVIL,MOTRIN) 200 MG tablet Take 200 mg by mouth 2 (Two) Times a Day.     • irbesartan (AVAPRO) 75 MG tablet Take 75 mg by mouth Every Night.     • levothyroxine (SYNTHROID, LEVOTHROID) 50 MCG tablet Take 50 mcg by mouth Daily.     • magnesium oxide (MAGOX) 400 (241.3 MG) MG tablet tablet Take 400 mg by mouth 2 (Two) Times a Day.     • Probiotic Product (PROBIOTIC DAILY PO) Take  by mouth.     • simvastatin (ZOCOR) 40 MG tablet Take 40 mg by mouth Every Night.     • vitamin B-12 (CYANOCOBALAMIN) 2500 MCG sublingual tablet tablet Place  under the tongue Daily.       No current facility-administered medications for this visit.          OBJECTIVE    Ht 157.5 cm (62\")   Wt 89.4 kg (197 lb)   BMI 36.03 kg/m²         Review of Systems     Constitutional:  Denies recent weight loss, weight gain, fever or chills     HENT:  Denies any hearing loss, epistaxis, hoarseness, or difficulty speaking.     Eyes: Wears eyeglasses or contact lenses     Respiratory:  Dyspnea with exertion,no cough, wheezing, or hemoptysis.     Cardiovascular: Negative for " palpations, chest pain, orthopnea, PND    Gastrointestinal:  Denies change in bowel habits, dyspepsia, ulcer disease, hematochezia, or melena.     Endocrine: Negative for cold intolerance, heat intolerance, polydipsia, polyphagia and polyuria.     Genitourinary: Negative.      Allergic/Immunologic: Negative.  Negative for environmental allergies, food allergies and immunocompromised state.     Neurological:  Denies any history of recurrent headaches, strokes, TIA, or seizure disorder.     Hematological: Denies any food allergies, seasonal allergies, bleeding disorders, or lymphadenopathy.     Psychiatric/Behavioral: Denies any history of depression, substance abuse, or change in cognitive function.       Lab Results   Component Value Date    WBC 7.57 04/06/2020    HGB 15.7 04/06/2020    HCT 46.7 (H) 04/06/2020    MCV 90.7 04/06/2020     04/06/2020     Lab Results   Component Value Date    BUN 16 03/15/2019    CREATININE 0.9 03/15/2019    CALCIUM 10.4 03/15/2019    ALBUMIN 3.4 03/15/2019    AST 16 03/15/2019    ALT 24 (L) 03/15/2019     Lab Results   Component Value Date    CHOL 171 03/15/2019     Lab Results   Component Value Date    TRIG 201 (H) 03/15/2019     Lab Results   Component Value Date    HDL 42 03/15/2019     No components found for: LDLCALC  Lab Results   Component Value Date    LDL 95 03/15/2019     No results found for: HDLLDLRATIO  No components found for: CHOLHDL  Lab Results   Component Value Date    HGBA1C 6.5 (H) 03/15/2019     Lab Results   Component Value Date    TSH 3.08 03/15/2019           ASSESSMENT AND PLAN  Jany Landaverde stable with regard to her heart but has a new diagnosis of non-small cell lung carcinoma with metastasis.  Chemotherapy and radiotherapy is being discussed with the patient.  The patient unfortunately continues to smoke but does not wish to quit smoking.  I once again had a discussion with her.   She has been advised to continue antiplatelet therapy with aspirin,  antihypertensive therapy with Cardizem CD, HCTZ, Avapro & lipid-lowering therapy with simvastatin.  No significant progression in valvular aortic stenosis was noted on echocardiogram last year.  This will be repeated on her next visit    Jany was seen today for follow-up.    Diagnoses and all orders for this visit:    Atherosclerosis of native coronary artery of native heart without angina pectoris    Mitral and aortic insufficiency    Nonrheumatic aortic valve stenosis    Malignant neoplasm of upper lobe of left lung (CMS/HCC)    PVD (peripheral vascular disease) (CMS/HCC)        Patient's Body mass index is 36.03 kg/m². BMI is above normal parameters. Recommendations include: exercise counseling and nutrition counseling.      Jany Landaverde is a current cigarettes user.  She currently smokes 1 pack of cigarettes per day for a duration of 50 years. I have educated her on the risk of diseases from using tobacco products such as cancer, COPD and heart diease.     I advised her to quit and she is not willing to quit.          Hermann Coronado MD  5/8/2020  10:56

## 2020-05-11 NOTE — TELEPHONE ENCOUNTER
PT called in to let Dr Salas know that she will no longer be taking any kind of treatment for her cancer. Please call pt to discuss @[phone]

## 2020-05-11 NOTE — PROGRESS NOTES
"Oncology SW contacted patient by phone in follow up to her message to physician that she has opted to forgo treatment.  SW spoke with Ms. Landaverde who states she has \"thought and prayed\" over her decision and is comforted in her choice to focus on comfort care.  SW encouraged pt to share her goals and current symptoms and offered information as to transition care to include home hospice care.  Pt states that she is experiencing symptoms of pain and difficulty sleeping due to pain. As pt discussed further, she opted to proceed with hospice referral and was given options for care. Pt. Chose [facility] and referral completed   by undersigned. SW spoke with Cheyanne and plans made to contact patient today by phone.  Ongoing support to pt was offered as well as emotional support and encouragement.   "

## 2020-05-12 NOTE — TELEPHONE ENCOUNTER
Oncology SW contacted by [facility] to advise they met face to face with pt this day and discussed home hospice at length.  Pt. States she is not ready to admit at this time, she is receptive to Hospice services and was provided with number to call when she would like to begin services.